# Patient Record
Sex: MALE | Race: WHITE | URBAN - METROPOLITAN AREA
[De-identification: names, ages, dates, MRNs, and addresses within clinical notes are randomized per-mention and may not be internally consistent; named-entity substitution may affect disease eponyms.]

---

## 2017-03-06 ENCOUNTER — OFFICE VISIT (OUTPATIENT)
Dept: FAMILY MEDICINE CLINIC | Age: 52
End: 2017-03-06

## 2017-03-06 VITALS
HEIGHT: 72 IN | HEART RATE: 59 BPM | BODY MASS INDEX: 16.66 KG/M2 | SYSTOLIC BLOOD PRESSURE: 133 MMHG | DIASTOLIC BLOOD PRESSURE: 96 MMHG | RESPIRATION RATE: 16 BRPM | OXYGEN SATURATION: 98 % | TEMPERATURE: 97.1 F | WEIGHT: 123 LBS

## 2017-03-06 DIAGNOSIS — F41.9 ANXIETY: ICD-10-CM

## 2017-03-06 DIAGNOSIS — M54.2 NECK PAIN: ICD-10-CM

## 2017-03-06 DIAGNOSIS — F10.20 ALCOHOLISM (HCC): Primary | ICD-10-CM

## 2017-03-06 DIAGNOSIS — Z88.9 H/O SEASONAL ALLERGIES: ICD-10-CM

## 2017-03-06 DIAGNOSIS — I10 ESSENTIAL HYPERTENSION: ICD-10-CM

## 2017-03-06 RX ORDER — LEVOTHYROXINE SODIUM 25 UG/1
25 TABLET ORAL
Qty: 30 TAB | Refills: 3 | Status: SHIPPED | OUTPATIENT
Start: 2017-03-06 | End: 2018-02-05 | Stop reason: SDUPTHER

## 2017-03-06 RX ORDER — GABAPENTIN 100 MG/1
CAPSULE ORAL 3 TIMES DAILY
COMMUNITY
End: 2017-03-06 | Stop reason: SDUPTHER

## 2017-03-06 RX ORDER — CYCLOBENZAPRINE HCL 5 MG
5 TABLET ORAL
COMMUNITY
End: 2017-03-06 | Stop reason: SDUPTHER

## 2017-03-06 RX ORDER — HYDROXYZINE PAMOATE 50 MG/1
25 CAPSULE ORAL
COMMUNITY
End: 2017-03-06 | Stop reason: SDUPTHER

## 2017-03-06 RX ORDER — HYDROXYZINE PAMOATE 50 MG/1
50 CAPSULE ORAL
Qty: 120 CAP | Refills: 3 | Status: SHIPPED | OUTPATIENT
Start: 2017-03-06 | End: 2022-06-27

## 2017-03-06 RX ORDER — LEVOTHYROXINE SODIUM 25 UG/1
TABLET ORAL
COMMUNITY
End: 2017-03-06 | Stop reason: SDUPTHER

## 2017-03-06 RX ORDER — LISINOPRIL 2.5 MG/1
2.5 TABLET ORAL DAILY
Qty: 30 TAB | Refills: 3 | Status: SHIPPED | OUTPATIENT
Start: 2017-03-06 | End: 2018-02-05 | Stop reason: DRUGHIGH

## 2017-03-06 RX ORDER — CETIRIZINE HCL 10 MG
10 TABLET ORAL
Qty: 30 TAB | Refills: 3 | Status: SHIPPED | OUTPATIENT
Start: 2017-03-06 | End: 2018-03-28 | Stop reason: SDUPTHER

## 2017-03-06 RX ORDER — CYCLOBENZAPRINE HCL 5 MG
5 TABLET ORAL
Qty: 60 TAB | Refills: 3 | Status: SHIPPED | OUTPATIENT
Start: 2017-03-06 | End: 2017-03-06 | Stop reason: DRUGHIGH

## 2017-03-06 RX ORDER — GABAPENTIN 100 MG/1
100 CAPSULE ORAL 3 TIMES DAILY
Qty: 90 CAP | Refills: 3 | Status: SHIPPED | OUTPATIENT
Start: 2017-03-06 | End: 2017-04-20 | Stop reason: DRUGHIGH

## 2017-03-06 RX ORDER — LORATADINE 10 MG/1
10 TABLET ORAL
COMMUNITY
End: 2017-03-06 | Stop reason: ALTCHOICE

## 2017-03-06 RX ORDER — TRAMADOL HYDROCHLORIDE 50 MG/1
50 TABLET ORAL 2 TIMES DAILY
Qty: 60 TAB | Refills: 1 | Status: SHIPPED | OUTPATIENT
Start: 2017-03-06 | End: 2018-02-05 | Stop reason: SDUPTHER

## 2017-03-06 RX ORDER — DULOXETIN HYDROCHLORIDE 30 MG/1
30 CAPSULE, DELAYED RELEASE ORAL DAILY
Qty: 30 CAP | Refills: 3 | Status: SHIPPED | OUTPATIENT
Start: 2017-03-06 | End: 2017-03-22 | Stop reason: SDUPTHER

## 2017-03-06 RX ORDER — CYCLOBENZAPRINE HCL 10 MG
10 TABLET ORAL
Qty: 60 TAB | Refills: 2 | Status: SHIPPED | OUTPATIENT
Start: 2017-03-06 | End: 2022-06-27 | Stop reason: SDUPTHER

## 2017-03-06 RX ORDER — DULOXETIN HYDROCHLORIDE 30 MG/1
30 CAPSULE, DELAYED RELEASE ORAL DAILY
COMMUNITY
End: 2017-03-06 | Stop reason: SDUPTHER

## 2017-03-06 RX ORDER — CLONIDINE HYDROCHLORIDE 0.1 MG/1
TABLET ORAL 2 TIMES DAILY
COMMUNITY
End: 2017-04-20 | Stop reason: ALTCHOICE

## 2017-03-06 RX ORDER — MELOXICAM 15 MG/1
15 TABLET ORAL DAILY
COMMUNITY
End: 2022-06-27

## 2017-03-06 NOTE — PROGRESS NOTES
Discharge instructions reviewed with patient  PAP application Cymbalta 30 mg po daily, Beconase 42 mcg one spray each nostril daily  RTC in 1 month recheck BP  Medication list and understanding of medications reviewed with patient. OTC and herbal medications reviewed and added to med list if applicable  Barriers to adherence assessed. Guidance given regarding new medications this visit, including reason for taking this medicine, and common side effects.

## 2017-03-06 NOTE — PROGRESS NOTES
HPI  Arely Whelan is a 54 y.o. male being seen today for   Chief Complaint   Patient presents with    Medication Refill   . IOV for this pt with chronic neck pain, alcoholism in remission, htn, hypothyroid. previoulsy at [facility] but cant afford the copay at this time. he states that he needs refills on meds. Was on lisinopril for bp but when he was having anxiety and  etoh withdrawal he was changed to clonidine. Sober at this time and hopeful and confident for sustained recovery. Tramadol is the only thing that helps his neck pain. Uses 2-3 daily from his prior pcp. Per pt has seen ortho and has \"locked\" vertebrae    Past Medical History:   Diagnosis Date    Alcoholism ([facility] [address]     Chronic pain     Hypertension     Thyroid disease          ROS  Patient states that he is feeling well. Denies complaints of chest pain, shortness of breath, swelling of legs, dizziness or weakness. he denies nausea, vomiting or diarrhea. Current Outpatient Prescriptions   Medication Sig    cloNIDine HCl (CATAPRES) 0.1 mg tablet Take  by mouth two (2) times a day.  meloxicam (MOBIC) 15 mg tablet Take 15 mg by mouth daily.  TRIAMCINOLONE, BULK, by Does Not Apply route.  lisinopril (PRINIVIL, ZESTRIL) 2.5 mg tablet Take 1 Tab by mouth daily.  cetirizine (ZYRTEC) 10 mg tablet Take 1 Tab by mouth daily as needed for Allergies.  DULoxetine (CYMBALTA) 30 mg capsule Take 1 Cap by mouth daily.  hydrOXYzine pamoate (VISTARIL) 50 mg capsule Take 1 Cap by mouth four (4) times daily as needed for Itching.  levothyroxine (SYNTHROID) 25 mcg tablet Take 1 Tab by mouth Daily (before breakfast).  gabapentin (NEURONTIN) 100 mg capsule Take 1 Cap by mouth three (3) times daily.  traMADol (ULTRAM) 50 mg tablet Take 1 Tab by mouth two (2) times a day. Max Daily Amount: 100 mg.  cyclobenzaprine (FLEXERIL) 10 mg tablet Take 1 Tab by mouth three (3) times daily as needed for Muscle Spasm(s). No current facility-administered medications for this visit. PE  Visit Vitals    BP (!) 133/96 (BP 1 Location: Left arm, BP Patient Position: Sitting)    Pulse (!) 59    Temp 97.1 °F (36.2 °C) (Oral)    Resp 16    Ht 6' (1.829 m)    Wt 123 lb (55.8 kg)    SpO2 98%    BMI 16.68 kg/m2        Alert and oriented with normal mood and affect. he is well developed and well nourished . Lungs are clear without wheezing. Heart rate is regular without murmurs or gallops. There is no lower extremity edema. No results found for this or any previous visit. Assessment and Plan:        ICD-10-CM ICD-9-CM    1. Alcoholism ([facility] [address] F10.20 303.90    2. Neck pain M54.2 723.1    3. Essential hypertension I10 401.9    4. Anxiety F41.9 300.00    5.  H/O seasonal allergies Z88.9 V15.09      Refilled meds as current except changed clonidine back to lisinopril  Pap for cymbalta 30mg  beconase 42 mcg one spray each nostril daily    rtc 1 mos recheck bp  Advised we cannot rx tramadol long term      La Nena Emery MD

## 2017-03-06 NOTE — PATIENT INSTRUCTIONS
Alcohol and Drug Problems: Care Instructions  Your Care Instructions  You can improve your life and health by stopping your use of alcohol or drugs. Ending dependency on alcohol or drugs may help you feel and sleep better. You may get along better with your family, friends, and coworkers. There are medicines and programs that can help. Follow-up care is a key part of your treatment and safety. Be sure to make and go to all appointments, and call your doctor if you are having problems. It's also a good idea to know your test results and keep a list of the medicines you take. How can you care for yourself at home? · If you have been given medicine to help keep you sober or reduce your cravings, be sure to take it as prescribed. · Talk to your doctor about programs that can help you stop using drugs or drinking alcohol. · If your doctor prescribes disulfiram (Antabuse), do not drink any alcohol while you are taking this medicine. You may have severe, even life-threatening, side effects from even small amounts of alcohol. · Do not tempt yourself by keeping alcohol or drugs in your home. · Learn how to say no when other people drink or use drugs. Or don't spend time with people who drink or use drugs. · Use the time and money spent on drinking or drugs to do something fun with your family or friends. Preventing a relapse  · Do not drink alcohol or use drugs at all. Using any amount of alcohol or drugs greatly increases your risk for relapse. · Seek help from organizations such as Alcoholics Anonymous, Narcotics Anonymous, or treatment facilities if you feel the need to drink alcohol or use drugs again. · Remember that recovery is a lifelong process. · Stay away from situations, friends, or places that may lead you to drink or use drugs. · Have a plan to spot and deal with relapse. Learn to recognize changes in your thinking that lead you to drink or use drugs. These are warning signs.  Get help before you start to drink or use drugs again. · Get help as soon as you can if you relapse. Some people make a plan with another person that outlines what they want that person to do for them if they relapse. The plan usually includes how to handle the relapse and who to notify in case of relapse. · Don't give up. Remember that a relapse does not mean that you have failed. Use the experience to learn the triggers that lead you to drink or use drugs. Then quit again. Many people have several relapses before they are able to quit for good. When should you call for help? Call 911 anytime you think you may need emergency care. For example, call if:  · You feel you cannot stop from hurting yourself or someone else. Call your doctor now or seek immediate medical care if:  · You have serious withdrawal symptoms such as confusion, hallucinations, or severe trembling. Watch closely for changes in your health, and be sure to contact your doctor if:  · You have a relapse. · You need more help or support to stop. Where can you learn more? Go to http://viral-jeffrey.info/. Enter [id number] in the search box to learn more about \"Alcohol and Drug Problems: Care Instructions. \"  Current as of: February 24, 2016  Content Version: 11.1  © 7784-0586 [employer]. Care instructions adapted under license by [employer] (which disclaims liability or warranty for this information). If you have questions about a medical condition or this instruction, always ask your healthcare professional. [place] [address] any warranty or liability for your use of this information. Anxiety Disorder: Care Instructions  Your Care Instructions  Anxiety is a normal reaction to stress. Difficult situations can cause you to have symptoms such as sweaty palms and a nervous feeling. In an anxiety disorder, the symptoms are far more severe.  Constant worry, muscle tension, trouble sleeping, nausea and diarrhea, and other symptoms can make normal daily activities difficult or impossible. These symptoms may occur for no reason, and they can affect your work, school, or social life. Medicines, counseling, and self-care can all help. Follow-up care is a key part of your treatment and safety. Be sure to make and go to all appointments, and call your doctor if you are having problems. It's also a good idea to know your test results and keep a list of the medicines you take. How can you care for yourself at home? · Take medicines exactly as directed. Call your doctor if you think you are having a problem with your medicine. · Go to your counseling sessions and follow-up appointments. · Recognize and accept your anxiety. Then, when you are in a situation that makes you anxious, say to yourself, \"This is not an emergency. I feel uncomfortable, but I am not in danger. I can keep going even if I feel anxious. \"  · Be kind to your body:  ¨ Relieve tension with exercise or a massage. ¨ Get enough rest.  ¨ Avoid alcohol, caffeine, nicotine, and illegal drugs. They can increase your anxiety level and cause sleep problems. ¨ Learn and do relaxation techniques. See below for more about these techniques. · Engage your mind. Get out and do something you enjoy. Go to a funny movie, or take a walk or hike. Plan your day. Having too much or too little to do can make you anxious. · Keep a record of your symptoms. Discuss your fears with a good friend or family member, or join a support group for people with similar problems. Talking to others sometimes relieves stress. · Get involved in social groups, or volunteer to help others. Being alone sometimes makes things seem worse than they are. · Get at least 30 minutes of exercise on most days of the week to relieve stress. Walking is a good choice. You also may want to do other activities, such as running, swimming, cycling, or playing tennis or team sports.   Relaxation techniques  Do relaxation exercises 10 to 20 minutes a day. You can play soothing, relaxing music while you do them, if you wish. · Tell others in your house that you are going to do your relaxation exercises. Ask them not to disturb you. · Find a comfortable place, away from all distractions and noise. · Lie down on your back, or sit with your back straight. · Focus on your breathing. Make it slow and steady. · Breathe in through your nose. Breathe out through either your nose or mouth. · Breathe deeply, filling up the area between your navel and your rib cage. Breathe so that your belly goes up and down. · Do not hold your breath. · Breathe like this for 5 to 10 minutes. Notice the feeling of calmness throughout your whole body. As you continue to breathe slowly and deeply, relax by doing the following for another 5 to 10 minutes:  · Tighten and relax each muscle group in your body. You can begin at your toes and work your way up to your head. · Imagine your muscle groups relaxing and becoming heavy. · Empty your mind of all thoughts. · Let yourself relax more and more deeply. · Become aware of the state of calmness that surrounds you. · When your relaxation time is over, you can bring yourself back to alertness by moving your fingers and toes and then your hands and feet and then stretching and moving your entire body. Sometimes people fall asleep during relaxation, but they usually wake up shortly afterward. · Always give yourself time to return to full alertness before you drive a car or do anything that might cause an accident if you are not fully alert. Never play a relaxation tape while you drive a car. When should you call for help? Call 911 anytime you think you may need emergency care. For example, call if:  · You feel you cannot stop from hurting yourself or someone else.   Keep the numbers for these national suicide hotlines: 7-244-508-TALK (8-920.574.1789) and 9-529-ELWELAT (7-157.633.6387). If you or someone you know talks about suicide or feeling hopeless, get help right away. Watch closely for changes in your health, and be sure to contact your doctor if:  · You have anxiety or fear that affects your life. · You have symptoms of anxiety that are new or different from those you had before. Where can you learn more? Go to http://viral-jeffrey.info/. Enter P754 in the search box to learn more about \"Anxiety Disorder: Care Instructions. \"  Current as of: July 26, 2016  Content Version: 11.1  © 2307-5950 [employer]. Care instructions adapted under license by [employer] (which disclaims liability or warranty for this information). If you have questions about a medical condition or this instruction, always ask your healthcare professional. [address] any warranty or liability for your use of this information. Neck Pain: Care Instructions  Your Care Instructions  You can have neck pain anywhere from the bottom of your head to the top of your shoulders. It can spread to the upper back or arms. Injuries, painting a ceiling, sleeping with your neck twisted, staying in one position for too long, and many other activities can cause neck pain. Most neck pain gets better with home care. Your doctor may recommend medicine to relieve pain or relax your muscles. He or she may suggest exercise and physical therapy to increase flexibility and relieve stress. You may need to wear a special (cervical) collar to support your neck for a day or two. Follow-up care is a key part of your treatment and safety. Be sure to make and go to all appointments, and call your doctor if you are having problems. It's also a good idea to know your test results and keep a list of the medicines you take. How can you care for yourself at home? · Try using a heating pad on a low or medium setting for 15 to 20 minutes every 2 or 3 hours.  Try a warm shower in place of one session with the heating pad. · You can also try an ice pack for 10 to 15 minutes every 2 to 3 hours. Put a thin cloth between the ice and your skin. · Take pain medicines exactly as directed. ¨ If the doctor gave you a prescription medicine for pain, take it as prescribed. ¨ If you are not taking a prescription pain medicine, ask your doctor if you can take an over-the-counter medicine. · If your doctor recommends a cervical collar, wear it exactly as directed. When should you call for help? Call your doctor now or seek immediate medical care if:  · You have new or worsening numbness in your arms, buttocks or legs. · You have new or worsening weakness in your arms or legs. (This could make it hard to stand up.)  · You lose control of your bladder or bowels. Watch closely for changes in your health, and be sure to contact your doctor if:  · Your neck pain is getting worse. · You are not getting better after 1 week. · You do not get better as expected. Where can you learn more? Go to http://viral-jeffrey.info/. Enter [id number] in the search box to learn more about \"Neck Pain: Care Instructions. \"  Current as of: May 23, 2016  Content Version: 11.1  © 9635-9323 [employer]. Care instructions adapted under license by [employer] (which disclaims liability or warranty for this information). If you have questions about a medical condition or this instruction, always ask your healthcare professional. [place] [address] any warranty or liability for your use of this information.

## 2017-03-06 NOTE — PROGRESS NOTES
Chief Complaint   Patient presents with    Medication Refill     1. When and where did you last receive medical care? Yes Where: [address]    2. When and where did you last have preventive care such as mammogram, pap smears or colon screening? no  3. What is your current living situation (for example, live alone, live in home with immediate family members)? [employer]    4. Do you have any problems with communication such trouble seeing, hearing, or understanding instructions? No    5. Do you have an advance directive? This is a document that you can give to family members with instructions for how you would want them to make health care decisions for you if you were unable to speak for yourself. (For example, unconscious, delerious)No    PMH/FH/Social Hx reviewed and updated as needed     Applicable screenings reviewed and updated as needed  Medication reconciliation performed. Patient does need medication refills. Health Maintenance reviewed.

## 2017-03-06 NOTE — MR AVS SNAPSHOT
Visit Information Date & Time Provider Department Dept. Phone Encounter #  
 3/6/2017  9:45 AM [place] [facility] [id number] Upcoming Health Maintenance Date Due DTaP/Tdap/Td series (1 - Tdap) 11/24/1988 INFLUENZA AGE 9 TO ADULT 8/1/2016 Allergies as of 3/6/2017  Review Complete On: 3/6/2017 By: Evelio Alarcon No Known Allergies Current Immunizations  Never Reviewed No immunizations on file. Not reviewed this visit You Were Diagnosed With   
  
 Codes Comments Alcoholism ([facility] [address]    -  Primary ICD-10-CM: F10.20 ICD-9-CM: 303.90 Neck pain     ICD-10-CM: M54.2 ICD-9-CM: 723.1 Essential hypertension     ICD-10-CM: I10 
ICD-9-CM: 401.9 Anxiety     ICD-10-CM: F41.9 ICD-9-CM: 300.00 H/O seasonal allergies     ICD-10-CM: Z88.9 ICD-9-CM: V15.09 Vitals BP Pulse Temp Resp Height(growth percentile) Weight(growth percentile) (!) 133/96 (BP 1 Location: Left arm, BP Patient Position: Sitting) (!) 59 97.1 °F (36.2 °C) (Oral) 16 6' (1.829 m) 123 lb (55.8 kg) SpO2 BMI Smoking Status 98% 16.68 kg/m2 Current Every Day Smoker Vitals History BMI and BSA Data Body Mass Index Body Surface Area  
 16.68 kg/m 2 1.68 m 2 Your Updated Medication List  
  
   
This list is accurate as of: 3/6/17 11:09 AM.  Always use your most recent med list.  
  
  
  
  
 cetirizine 10 mg tablet Commonly known as:  ZYRTEC Take 1 Tab by mouth daily as needed for Allergies. cloNIDine HCl 0.1 mg tablet Commonly known as:  CATAPRES Take  by mouth two (2) times a day. cyclobenzaprine 5 mg tablet Commonly known as:  FLEXERIL Take 1 Tab by mouth three (3) times daily as needed for Muscle Spasm(s). DULoxetine 30 mg capsule Commonly known as:  CYMBALTA Take 1 Cap by mouth daily. gabapentin 100 mg capsule Commonly known as:  NEURONTIN  
 Take 1 Cap by mouth three (3) times daily. hydrOXYzine pamoate 50 mg capsule Commonly known as:  VISTARIL Take 1 Cap by mouth four (4) times daily as needed for Itching. levothyroxine 25 mcg tablet Commonly known as:  SYNTHROID Take 1 Tab by mouth Daily (before breakfast). lisinopril 2.5 mg tablet Commonly known as:  Pinky Pinch Take 1 Tab by mouth daily. MOBIC 15 mg tablet Generic drug:  meloxicam  
Take 15 mg by mouth daily. traMADol 50 mg tablet Commonly known as:  ULTRAM  
Take 1 Tab by mouth two (2) times a day. Max Daily Amount: 100 mg. TRIAMCINOLONE (BULK)  
by Does Not Apply route. Prescriptions Printed Refills  
 lisinopril (PRINIVIL, ZESTRIL) 2.5 mg tablet 3 Sig: Take 1 Tab by mouth daily. Class: Print Route: Oral  
 cetirizine (ZYRTEC) 10 mg tablet 3 Sig: Take 1 Tab by mouth daily as needed for Allergies. Class: Print Route: Oral  
 DULoxetine (CYMBALTA) 30 mg capsule 3 Sig: Take 1 Cap by mouth daily. Class: Print Route: Oral  
 cyclobenzaprine (FLEXERIL) 5 mg tablet 3 Sig: Take 1 Tab by mouth three (3) times daily as needed for Muscle Spasm(s). Class: Print Route: Oral  
 hydrOXYzine pamoate (VISTARIL) 50 mg capsule 3 Sig: Take 1 Cap by mouth four (4) times daily as needed for Itching. Class: Print Route: Oral  
 levothyroxine (SYNTHROID) 25 mcg tablet 3 Sig: Take 1 Tab by mouth Daily (before breakfast). Class: Print Route: Oral  
 gabapentin (NEURONTIN) 100 mg capsule 3 Sig: Take 1 Cap by mouth three (3) times daily. Class: Print Route: Oral  
 traMADol (ULTRAM) 50 mg tablet 1 Sig: Take 1 Tab by mouth two (2) times a day. Max Daily Amount: 100 mg. Class: Print Route: Oral  
  
Patient Instructions Alcohol and Drug Problems: Care Instructions Your Care Instructions You can improve your life and health by stopping your use of alcohol or drugs. Ending dependency on alcohol or drugs may help you feel and sleep better. You may get along better with your family, friends, and coworkers. There are medicines and programs that can help. Follow-up care is a key part of your treatment and safety. Be sure to make and go to all appointments, and call your doctor if you are having problems. It's also a good idea to know your test results and keep a list of the medicines you take. How can you care for yourself at home? · If you have been given medicine to help keep you sober or reduce your cravings, be sure to take it as prescribed. · Talk to your doctor about programs that can help you stop using drugs or drinking alcohol. · If your doctor prescribes disulfiram (Antabuse), do not drink any alcohol while you are taking this medicine. You may have severe, even life-threatening, side effects from even small amounts of alcohol. · Do not tempt yourself by keeping alcohol or drugs in your home. · Learn how to say no when other people drink or use drugs. Or don't spend time with people who drink or use drugs. · Use the time and money spent on drinking or drugs to do something fun with your family or friends. Preventing a relapse · Do not drink alcohol or use drugs at all. Using any amount of alcohol or drugs greatly increases your risk for relapse. · Seek help from organizations such as Alcoholics Anonymous, Narcotics Anonymous, or treatment facilities if you feel the need to drink alcohol or use drugs again. · Remember that recovery is a lifelong process. · Stay away from situations, friends, or places that may lead you to drink or use drugs. · Have a plan to spot and deal with relapse. Learn to recognize changes in your thinking that lead you to drink or use drugs. These are warning signs. Get help before you start to drink or use drugs again. · Get help as soon as you can if you relapse.  Some people make a plan with another person that outlines what they want that person to do for them if they relapse. The plan usually includes how to handle the relapse and who to notify in case of relapse. · Don't give up. Remember that a relapse does not mean that you have failed. Use the experience to learn the triggers that lead you to drink or use drugs. Then quit again. Many people have several relapses before they are able to quit for good. When should you call for help? Call 911 anytime you think you may need emergency care. For example, call if: 
· You feel you cannot stop from hurting yourself or someone else. Call your doctor now or seek immediate medical care if: 
· You have serious withdrawal symptoms such as confusion, hallucinations, or severe trembling. Watch closely for changes in your health, and be sure to contact your doctor if: 
· You have a relapse. · You need more help or support to stop. Where can you learn more? Go to http://viralZigswitchjeffrey.info/. Enter [id number] in the search box to learn more about \"Alcohol and Drug Problems: Care Instructions. \" Current as of: February 24, 2016 Content Version: 11.1 © 2722-7718 [employer]. Care instructions adapted under license by [employer] (which disclaims liability or warranty for this information). If you have questions about a medical condition or this instruction, always ask your healthcare professional. [address] any warranty or liability for your use of this information. Anxiety Disorder: Care Instructions Your Care Instructions Anxiety is a normal reaction to stress. Difficult situations can cause you to have symptoms such as sweaty palms and a nervous feeling. In an anxiety disorder, the symptoms are far more severe. Constant worry, muscle tension, trouble sleeping, nausea and diarrhea, and other symptoms can make normal daily activities difficult or impossible.  These symptoms may occur for no reason, and they can affect your work, school, or social life. Medicines, counseling, and self-care can all help. Follow-up care is a key part of your treatment and safety. Be sure to make and go to all appointments, and call your doctor if you are having problems. It's also a good idea to know your test results and keep a list of the medicines you take. How can you care for yourself at home? · Take medicines exactly as directed. Call your doctor if you think you are having a problem with your medicine. · Go to your counseling sessions and follow-up appointments. · Recognize and accept your anxiety. Then, when you are in a situation that makes you anxious, say to yourself, \"This is not an emergency. I feel uncomfortable, but I am not in danger. I can keep going even if I feel anxious. \" · Be kind to your body: ¨ Relieve tension with exercise or a massage. ¨ Get enough rest. 
¨ Avoid alcohol, caffeine, nicotine, and illegal drugs. They can increase your anxiety level and cause sleep problems. ¨ Learn and do relaxation techniques. See below for more about these techniques. · Engage your mind. Get out and do something you enjoy. Go to a funny movie, or take a walk or hike. Plan your day. Having too much or too little to do can make you anxious. · Keep a record of your symptoms. Discuss your fears with a good friend or family member, or join a support group for people with similar problems. Talking to others sometimes relieves stress. · Get involved in social groups, or volunteer to help others. Being alone sometimes makes things seem worse than they are. · Get at least 30 minutes of exercise on most days of the week to relieve stress. Walking is a good choice. You also may want to do other activities, such as running, swimming, cycling, or playing tennis or team sports. Relaxation techniques Do relaxation exercises 10 to 20 minutes a day.  You can play soothing, relaxing music while you do them, if you wish. · Tell others in your house that you are going to do your relaxation exercises. Ask them not to disturb you. · Find a comfortable place, away from all distractions and noise. · Lie down on your back, or sit with your back straight. · Focus on your breathing. Make it slow and steady. · Breathe in through your nose. Breathe out through either your nose or mouth. · Breathe deeply, filling up the area between your navel and your rib cage. Breathe so that your belly goes up and down. · Do not hold your breath. · Breathe like this for 5 to 10 minutes. Notice the feeling of calmness throughout your whole body. As you continue to breathe slowly and deeply, relax by doing the following for another 5 to 10 minutes: · Tighten and relax each muscle group in your body. You can begin at your toes and work your way up to your head. · Imagine your muscle groups relaxing and becoming heavy. · Empty your mind of all thoughts. · Let yourself relax more and more deeply. · Become aware of the state of calmness that surrounds you. · When your relaxation time is over, you can bring yourself back to alertness by moving your fingers and toes and then your hands and feet and then stretching and moving your entire body. Sometimes people fall asleep during relaxation, but they usually wake up shortly afterward. · Always give yourself time to return to full alertness before you drive a car or do anything that might cause an accident if you are not fully alert. Never play a relaxation tape while you drive a car. When should you call for help? Call 911 anytime you think you may need emergency care. For example, call if: 
· You feel you cannot stop from hurting yourself or someone else. Keep the numbers for these national suicide hotlines: 5-192-326-TALK (0-469.661.1945) and 8-295-MXMYPYS (1-382.388.6029).  If you or someone you know talks about suicide or feeling hopeless, get help right away. Watch closely for changes in your health, and be sure to contact your doctor if: 
· You have anxiety or fear that affects your life. · You have symptoms of anxiety that are new or different from those you had before. Where can you learn more? Go to http://viral-jeffrey.info/. Enter P754 in the search box to learn more about \"Anxiety Disorder: Care Instructions. \" Current as of: July 26, 2016 Content Version: 11.1 © 5542-4205 [employer]. Care instructions adapted under license by [employer] (which disclaims liability or warranty for this information). If you have questions about a medical condition or this instruction, always ask your healthcare professional. [address] any warranty or liability for your use of this information. Neck Pain: Care Instructions Your Care Instructions You can have neck pain anywhere from the bottom of your head to the top of your shoulders. It can spread to the upper back or arms. Injuries, painting a ceiling, sleeping with your neck twisted, staying in one position for too long, and many other activities can cause neck pain. Most neck pain gets better with home care. Your doctor may recommend medicine to relieve pain or relax your muscles. He or she may suggest exercise and physical therapy to increase flexibility and relieve stress. You may need to wear a special (cervical) collar to support your neck for a day or two. Follow-up care is a key part of your treatment and safety. Be sure to make and go to all appointments, and call your doctor if you are having problems. It's also a good idea to know your test results and keep a list of the medicines you take. How can you care for yourself at home?  
· Try using a heating pad on a low or medium setting for 15 to 20 minutes every 2 or 3 hours. Try a warm shower in place of one session with the heating pad. · You can also try an ice pack for 10 to 15 minutes every 2 to 3 hours. Put a thin cloth between the ice and your skin. · Take pain medicines exactly as directed. ¨ If the doctor gave you a prescription medicine for pain, take it as prescribed. ¨ If you are not taking a prescription pain medicine, ask your doctor if you can take an over-the-counter medicine. · If your doctor recommends a cervical collar, wear it exactly as directed. When should you call for help? Call your doctor now or seek immediate medical care if: 
· You have new or worsening numbness in your arms, buttocks or legs. · You have new or worsening weakness in your arms or legs. (This could make it hard to stand up.) · You lose control of your bladder or bowels. Watch closely for changes in your health, and be sure to contact your doctor if: 
· Your neck pain is getting worse. · You are not getting better after 1 week. · You do not get better as expected. Where can you learn more? Go to http://viral-jeffrey.info/. Enter [id number] in the search box to learn more about \"Neck Pain: Care Instructions. \" Current as of: May 23, 2016 Content Version: 11.1 © 7866-5380 Healthwise[employer] Incorporated. Care instructions adapted under license by [employer] (which disclaims liability or warranty for this information). If you have questions about a medical condition or this instruction, always ask your healthcare professional. [place] [address] any warranty or liability for your use of this information. Introducing [facility]! Turner Garcia introduces [employer] patient portal. Now you can access parts of your medical record, email your doctor's office, and request medication refills online. 1. In your internet browser, go to [email or web address] 2. Click on the First Time User? Click Here link in the Sign In box. You will see the New Member Sign Up page. 3. Enter your [employer] Access Code exactly as it appears below. You will not need to use this code after youve completed the sign-up process. If you do not sign up before the expiration date, you must request a new code. · [employer] Access Code: [email or web address] Expires: 6/4/2017 11:09 AM 
 
4. Enter the last four digits of your Social Security Number (xxxx) and Date of Birth (mm/dd/yyyy) as indicated and click Submit. You will be taken to the next sign-up page. 5. Create a [employer] ID. This will be your [employer] login ID and cannot be changed, so think of one that is secure and easy to remember. 6. Create a [employer] password. You can change your password at any time. 7. Enter your Password Reset Question and Answer. This can be used at a later time if you forget your password. 8. Enter your e-mail address. You will receive e-mail notification when new information is available in [address] 9. Click Sign Up. You can now view and download portions of your medical record. 10. Click the Download Summary menu link to download a portable copy of your medical information. If you have questions, please visit the Frequently Asked Questions section of the [employer] website. Remember, [employer] is NOT to be used for urgent needs. For medical emergencies, dial 911. Now available from your iPhone and Android! Please provide this summary of care documentation to your next provider. If you have any questions after today's visit, please call [phone].

## 2017-03-16 ENCOUNTER — OFFICE VISIT (OUTPATIENT)
Dept: FAMILY MEDICINE CLINIC | Age: 52
End: 2017-03-16

## 2017-03-16 VITALS
HEIGHT: 72 IN | RESPIRATION RATE: 12 BRPM | TEMPERATURE: 98.1 F | BODY MASS INDEX: 16.8 KG/M2 | WEIGHT: 124 LBS | DIASTOLIC BLOOD PRESSURE: 93 MMHG | SYSTOLIC BLOOD PRESSURE: 149 MMHG | OXYGEN SATURATION: 100 % | HEART RATE: 93 BPM

## 2017-03-16 DIAGNOSIS — J40 BRONCHITIS: Primary | ICD-10-CM

## 2017-03-16 DIAGNOSIS — Z88.9 H/O SEASONAL ALLERGIES: ICD-10-CM

## 2017-03-16 DIAGNOSIS — L40.9 PSORIASIS: ICD-10-CM

## 2017-03-16 RX ORDER — CLOBETASOL PROPIONATE 0.5 MG/G
OINTMENT TOPICAL 2 TIMES DAILY
Qty: 30 G | Refills: 1
Start: 2017-03-16 | End: 2022-06-27 | Stop reason: SDUPTHER

## 2017-03-16 RX ORDER — PENICILLIN V POTASSIUM 500 MG/1
500 TABLET, FILM COATED ORAL 3 TIMES DAILY
Qty: 21 TAB | Refills: 0
Start: 2017-03-16 | End: 2017-03-23

## 2017-03-16 NOTE — PROGRESS NOTES
MARQUEZ Mcmanus is a 54 y.o. male being seen today for   Chief Complaint   Patient presents with    Sinus Pain     Congestion, headache, productive cough, green mucus x 8 days   . he states that for 8 days uri sx. Started with sore throat, then congestion and post nasal drip. Lost voice and then it came back. Some body aches. All of this is gettting better but still has sore throat and sputum is yellow. Some headaches. No fever    pcn has worked well for bronchitis in the past    Has not started lisinopril. Self increased to 200mg gabapentin at hs and that is helping pain. Also wanted cream for psoriasis. Used clobetasol in the past with good results    Past Medical History:   Diagnosis Date    Alcoholism ([address]     Chronic pain     Hypertension     Thyroid disease          ROS  Patient states that he is feeling well. Denies complaints of chest pain, shortness of breath, swelling of legs, dizziness or weakness. he denies nausea, vomiting or diarrhea. Current Outpatient Prescriptions   Medication Sig    penicillin v potassium (VEETID) 500 mg tablet Take 1 Tab by mouth three (3) times daily for 7 days.  clobetasol (TEMOVATE) 0.05 % ointment Apply  to affected area two (2) times a day.  cloNIDine HCl (CATAPRES) 0.1 mg tablet Take  by mouth two (2) times a day.  cetirizine (ZYRTEC) 10 mg tablet Take 1 Tab by mouth daily as needed for Allergies.  DULoxetine (CYMBALTA) 30 mg capsule Take 1 Cap by mouth daily.  hydrOXYzine pamoate (VISTARIL) 50 mg capsule Take 1 Cap by mouth four (4) times daily as needed for Itching.  levothyroxine (SYNTHROID) 25 mcg tablet Take 1 Tab by mouth Daily (before breakfast).  gabapentin (NEURONTIN) 100 mg capsule Take 1 Cap by mouth three (3) times daily.  traMADol (ULTRAM) 50 mg tablet Take 1 Tab by mouth two (2) times a day. Max Daily Amount: 100 mg.     cyclobenzaprine (FLEXERIL) 10 mg tablet Take 1 Tab by mouth three (3) times daily as needed for Muscle Spasm(s).  meloxicam (MOBIC) 15 mg tablet Take 15 mg by mouth daily.  TRIAMCINOLONE, BULK, by Does Not Apply route.  lisinopril (PRINIVIL, ZESTRIL) 2.5 mg tablet Take 1 Tab by mouth daily. No current facility-administered medications for this visit. PE  Visit Vitals    BP (!) 149/93 (BP 1 Location: Left arm, BP Patient Position: Sitting)    Pulse 93    Temp 98.1 °F (36.7 °C) (Oral)    Resp 12    Ht 6' (1.829 m)    Wt 124 lb (56.2 kg)    SpO2 100%    BMI 16.82 kg/m2        Alert and oriented with normal mood and affect. he is well developed and well nourished . Lungs are clear without wheezing. Heart rate is regular without murmurs or gallops. There is no lower extremity edema. TMs OP/PP clear. No cervical LAD    No results found for this or any previous visit. Assessment and Plan:        ICD-10-CM ICD-9-CM    1. Bronchitis  Likely viral  Will observe for now  No better 3-5 days can fill and take rx penicillin  [id number]    2. H/O seasonal allergies  rx flonase while await beconase Z88.9 V15.09    3.  Psoriasis  rx clobetasol L40.9 King Marshall MD

## 2017-03-16 NOTE — PROGRESS NOTES
No chief complaint on file. 1. Have you been to the ER, urgent care clinic since your last visit? Hospitalized since your last visit? No    2. Have you seen or consulted any other health care providers outside of the [address] [place] [address] since your last visit? No    3. When was your last Mammogram, Pap smear, and/or Colon screening? Mammogram: Year: n/a Pap smear: year: n/a Colonoscopy: Year:n/a  PMH/FH/Social Hx reviewed and updated as needed      Applicable screenings reviewed and updated as needed  Medication reconciliation performed. Patient does not need medication refills. Health Maintenance reviewed.

## 2017-03-16 NOTE — PROGRESS NOTES
Discharge instructions reviewed with patient    Medication list and understanding of medications reviewed with patient. OTC and herbal medications reviewed and added to med list if applicable  Barriers to adherence assessed. Guidance given regarding new medications this visit, including reason for taking this medicine, and common side effects. Made f/u appointment.

## 2017-03-16 NOTE — MR AVS SNAPSHOT
Visit Information Date & Time Provider Department Dept. Phone Encounter #  
 3/16/2017  9:15 AM Julio Padron, [address] [place] [address] [id number] Your Appointments 4/20/2017  1:30 PM  
Follow Up with Julio Padron MD  
[facility] [place] [facility]) Appt Note: f/u APPOINTMENT  
 [facility] 83 205 [facility] 83 [phone] Upcoming Health Maintenance Date Due Pneumococcal 19-64 Medium Risk (1 of 1 - PPSV23) 11/24/1986 DTaP/Tdap/Td series (1 - Tdap) 11/24/1988 INFLUENZA AGE 9 TO ADULT 8/1/2016 Allergies as of 3/16/2017  Review Complete On: 3/16/2017 By: Jay Mejia No Known Allergies Current Immunizations  Never Reviewed No immunizations on file. Not reviewed this visit You Were Diagnosed With   
  
 Codes Comments Bronchitis    -  Primary ICD-10-CM: Q65 ICD-9-CM: 975 H/O seasonal allergies     ICD-10-CM: Z88.9 ICD-9-CM: V15.09 Psoriasis     ICD-10-CM: L40.9 ICD-9-CM: 696.1 Vitals BP Pulse Temp Resp Height(growth percentile) Weight(growth percentile) (!) 149/93 (BP 1 Location: Left arm, BP Patient Position: Sitting) 93 98.1 °F (36.7 °C) (Oral) 12 6' (1.829 m) 124 lb (56.2 kg) SpO2 BMI Smoking Status 100% 16.82 kg/m2 Current Every Day Smoker BMI and BSA Data Body Mass Index Body Surface Area  
 16.82 kg/m 2 1.69 m 2 Preferred Pharmacy Pharmacy Name Phone [address], [address] [facility] [phone] Your Updated Medication List  
  
   
This list is accurate as of: 3/16/17 10:46 AM.  Always use your most recent med list.  
  
  
  
  
 cetirizine 10 mg tablet Commonly known as:  ZYRTEC Take 1 Tab by mouth daily as needed for Allergies. clobetasol 0.05 % ointment Commonly known as:  [place] Apply  to affected area two (2) times a day. cloNIDine HCl 0.1 mg tablet Commonly known as:  CATAPRES Take  by mouth two (2) times a day. cyclobenzaprine 10 mg tablet Commonly known as:  FLEXERIL Take 1 Tab by mouth three (3) times daily as needed for Muscle Spasm(s). DULoxetine 30 mg capsule Commonly known as:  CYMBALTA Take 1 Cap by mouth daily. gabapentin 100 mg capsule Commonly known as:  NEURONTIN Take 1 Cap by mouth three (3) times daily. hydrOXYzine pamoate 50 mg capsule Commonly known as:  VISTARIL Take 1 Cap by mouth four (4) times daily as needed for Itching. levothyroxine 25 mcg tablet Commonly known as:  SYNTHROID Take 1 Tab by mouth Daily (before breakfast). lisinopril 2.5 mg tablet Commonly known as:  Dhruv Shutters Take 1 Tab by mouth daily. MOBIC 15 mg tablet Generic drug:  meloxicam  
Take 15 mg by mouth daily. penicillin v potassium 500 mg tablet Commonly known as:  VEETID Take 1 Tab by mouth three (3) times daily for 7 days. traMADol 50 mg tablet Commonly known as:  ULTRAM  
Take 1 Tab by mouth two (2) times a day. Max Daily Amount: 100 mg. TRIAMCINOLONE (BULK)  
by Does Not Apply route. Introducing [address]! Danuta Roque introduces [employer] patient portal. Now you can access parts of your medical record, email your doctor's office, and request medication refills online. 1. In your internet browser, go to [email or web address] 2. Click on the First Time User? Click Here link in the Sign In box. You will see the New Member Sign Up page. 3. Enter your [employer] Access Code exactly as it appears below. You will not need to use this code after youve completed the sign-up process. If you do not sign up before the expiration date, you must request a new code. · [employer] Access Code: [email or web address] Expires: 6/4/2017 12:09 PM 
 
4.  Enter the last four digits of your Social Security Number (xxxx) and Date of Birth (mm/dd/yyyy) as indicated and click Submit. You will be taken to the next sign-up page. 5. Create a [employer] ID. This will be your [employer] login ID and cannot be changed, so think of one that is secure and easy to remember. 6. Create a [employer] password. You can change your password at any time. 7. Enter your Password Reset Question and Answer. This can be used at a later time if you forget your password. 8. Enter your e-mail address. You will receive e-mail notification when new information is available in [address] 9. Click Sign Up. You can now view and download portions of your medical record. 10. Click the Download Summary menu link to download a portable copy of your medical information. If you have questions, please visit the Frequently Asked Questions section of the [employer] website. Remember, [employer] is NOT to be used for urgent needs. For medical emergencies, dial 911. Now available from your iPhone and Android! Please provide this summary of care documentation to your next provider. If you have any questions after today's visit, please call [phone].

## 2017-03-21 DIAGNOSIS — Z91.09 ENVIRONMENTAL ALLERGIES: Primary | ICD-10-CM

## 2017-03-21 NOTE — TELEPHONE ENCOUNTER
[address] [place] [address] sent patient Jim Kirby Beconase -aq nasal spray through the PAP. Letter sent to patient for  and order routed to provider.

## 2017-03-21 NOTE — LETTER
3/21/2017 1:16 PM 
 
Mr. Malia [address] [facility] [phone] Thank you for participating in the [facility] Patient Assistance Program. 
 
This is notification that your item(s) was delivered to us. Please  your item(s) between 11:00 am and 1:00 pm, at one of our [place] sites within 14 days. When you arrive, you will need to register inside as a \"nurse visit\" to  your medication. Notify the registrar that you are there to  medication and they will register you as soon as possible. There may be a short wait but we try to make the process as fast as possible. If you fail to follow-up for regular appointments, the [facility] may discontinue providing your medication. To see when the [address] will be in your neighborhood, go to 
[email or web address] 
or call [phone], select your language (1 for english or 2 for Portuguese), and then option 2. Sincerely, Emaline Boast, MD

## 2017-03-22 NOTE — LETTER
3/22/2017 12:02 PM 
 
Mr. Reese [address] [facility] [phone] Thank you for participating in the [address] [place] [address] Patient Assistance Program. 
 
This is notification that your item(s) was delivered to us. Please  your item(s) between 11:00 am and 1:00 pm, at one of our [place] sites within 14 days. When you arrive, you will need to register inside as a \"nurse visit\" to  your medication. Notify the registrar that you are there to  medication and they will register you as soon as possible. There may be a short wait but we try to make the process as fast as possible. If you fail to follow-up for regular appointments, the [address] [place] [address] may discontinue providing your medication. To see when the [address] will be in your neighborhood, go to 
[email or web address] 
or call [phone], select your language (1 for english or 2 for Nigerien), and then option 2. Sincerely, Richmond Deluca MD

## 2017-03-22 NOTE — LETTER
Name  
Tin Ward  YOB: 1965 Effective Dates 3/22/2017  to *** GREEN means Go! Use CONTROL medicine daily YELLOW means Caution! Add RESCUE medicine RED means DANGER! Get help from a doctor now! Health Care Provider Lynne Simon MD  Providers Phone [phone] 
y  
y  
y Parent/Guardian  
*** Parent/Guardian Phone   Parent/Guardian Email:   
Additional Emergency Contact  Contact Phone   Contact Email:   
Asthma Severity ? Intermittent or Persistent: ? Mild ? Moderate ? Severe  Asthma Triggers (Things that make your asthma worse) ? Colds ? Smoke (tobacco, incense) ? Pollen ? Dust ? Animals:_________ ? Strong odors ? Mold/moisture ? Pests (rodents, cockroaches) ? Stress/Emotions ? Exercise ? Gastroesophageal reflux ? Season ([place]): Fall, Winter, Spring, Summer ? Other:_____________________________________ Last Flu Shot: / / *** Pneumonia Shot: / / [place]us [place] Zone: Go!   Take these CONTROL (PREVENTION) Medicines EVERY Day You have ALL of these:  Breathing is easy  No cough or wheeze  Can work and play  Can sleep all night Peak flow in this area: ______ to ______ (More than 80% of Personal Best) Personal best peak flow:________  . No control medicines required. .___***________________________________, ____ puff (s) MDI with Spacer __ times a day Inhaled Corticosteroid or Inhaled corticosteroid/long-acting â-agonist .____________________***_________________, ____ nebulizer treatment (s) _***_ times a day Inhaled Corticosteroid . ______****_____________________, take _***___ by mouth once daily at bedtime Leukotriene antagonist For asthma with exercise, ADD: .____****____, ___***__ puffs with spacer 15 minutes before exercise Fast acting Inhaled â-agonist For nasal/environmental allergy, ADD: .____________________________, use _____ spray (s) per nostril _____ times a day Nasal corticosteroid Always rinse mouth after using your daily inhaled medicine. You have ANY of these: Yellow Zone: Caution!   Continue CONTROL Medicines and ADD RESCUE Medicines  Cough or mild wheeze  ._________________, ____ puffs with spacer every ____ hours as needed Inhaled b-agonist   
 First sign of cold  . _________________, ____ nebulizer treatment (s) every ____ hours as needed  Tight chest  Inhaled b-agonist   
 Problems sleeping,  . Other ________________________________________________________   
working, or playing Peak flow in this area: Call your Healthcare Provider if you need rescue medicine for more than 24   
______ to _____ (60%-80% of Personal Best)  hours or two times a week, or if your rescue medicine doesnt work You have ANY of these:  Cant talk, eat, or walk well  Medicine is not helping  Breathing hard and fast  Blue lips and fingernails  Tired or lethargic  Ribs show Peak flow in this area: _____ to _____  . ________________, __ puffs with spacer every 15 minutes, for THREE treatments Inhaled â-agonist . ________________, __ nebulizer treatment every 15 minutes, for THREE treatments Inhaled â-agonist Call your doctor while administering the treatments. .Other IF YOU CANNOT CONTACT YOUR DOCTOR: Call 911 for an ambulance, or go directly to the Emergency Department! SCHOOL MEDICATION CONSENT & HEALTH CARE PROVIDER ORDER CHECK ALL THAT APPLY: ____ Student instructed in proper use of their asthma medications, and in my opinion, CAN CARRY AND SELF-ADMINISTER INHALER AT SCHOOL. ____ Student is to notify designated school health officials after using inhaler at school. ____ Student needs supervision or assistance to use inhaler. ____ Student should NOT carry inhaler while at school. MD/NP/PA SIGNATURE: ____________________________ DATE_______ REQUIRED SIGNATURES: I give permission for school personnel to follow this plan, administer medication and care for my child and contact my provider if necessary.  I assume full responsibility for providing the school with prescribed medication and delivery/ monitoring devices. I approve this Asthma Management Plan for my child. PARENT/GUARDIAN ________________________ Date _______ SCHOOL NURSE/DESIGNEE ___________________ Date _______ OTHER _________________________________ Date _______ [place] Asthma Action Plan approved by the [facility]) 4/11 Based on NAEPP Guidelines and modified with permission from the [facility]. Asthma Action Plan via [address] [employer], and [employer] of [address] [employer]

## 2017-03-22 NOTE — TELEPHONE ENCOUNTER
Korin sent 120 tabs of Cymbalta 30 mg  Through the PAP. Letter sent to patient for  and order routed to provider.

## 2017-03-23 NOTE — TELEPHONE ENCOUNTER
Med list and chart notes reviewed. Pharmacy Assistance Medication approved for pickup.     beconase 42 mcg   One spray each nostril daily

## 2017-03-28 RX ORDER — DULOXETIN HYDROCHLORIDE 30 MG/1
30 CAPSULE, DELAYED RELEASE ORAL DAILY
Qty: 120 CAP | Refills: 0 | Status: SHIPPED | COMMUNITY
Start: 2017-03-28 | End: 2018-03-09 | Stop reason: SDUPTHER

## 2017-03-28 NOTE — TELEPHONE ENCOUNTER
Med list and chart notes reviewed. Pharmacy Assistance Medication approved for pickup.     cymbalta 30 mg po daily    First pap pickup, please ensure pt knows how to call for refill

## 2017-04-20 ENCOUNTER — OFFICE VISIT (OUTPATIENT)
Dept: FAMILY MEDICINE CLINIC | Age: 52
End: 2017-04-20

## 2017-04-20 ENCOUNTER — HOSPITAL ENCOUNTER (OUTPATIENT)
Dept: LAB | Age: 52
Discharge: HOME | End: 2017-04-20

## 2017-04-20 VITALS
SYSTOLIC BLOOD PRESSURE: 148 MMHG | DIASTOLIC BLOOD PRESSURE: 95 MMHG | WEIGHT: 124 LBS | BODY MASS INDEX: 16.8 KG/M2 | HEIGHT: 72 IN | HEART RATE: 92 BPM | TEMPERATURE: 98.9 F | OXYGEN SATURATION: 99 % | RESPIRATION RATE: 15 BRPM

## 2017-04-20 DIAGNOSIS — E03.8 HYPOTHYROIDISM DUE TO HASHIMOTO'S THYROIDITIS: ICD-10-CM

## 2017-04-20 DIAGNOSIS — R53.83 FATIGUE, UNSPECIFIED TYPE: ICD-10-CM

## 2017-04-20 DIAGNOSIS — I10 ESSENTIAL HYPERTENSION: Primary | ICD-10-CM

## 2017-04-20 DIAGNOSIS — E06.3 HYPOTHYROIDISM DUE TO HASHIMOTO'S THYROIDITIS: ICD-10-CM

## 2017-04-20 DIAGNOSIS — Z88.9 H/O SEASONAL ALLERGIES: ICD-10-CM

## 2017-04-20 LAB
ALBUMIN SERPL BCP-MCNC: 3.7 G/DL (ref 3.4–5)
ALBUMIN/GLOB SERPL: 1.1 {RATIO} (ref 0.8–1.7)
ALP SERPL-CCNC: 135 U/L (ref 45–117)
ALT SERPL-CCNC: 29 U/L (ref 16–61)
ANION GAP BLD CALC-SCNC: 8 MMOL/L (ref 3–18)
AST SERPL W P-5'-P-CCNC: 20 U/L (ref 15–37)
BASOPHILS # BLD AUTO: 0.1 K/UL (ref 0–0.06)
BASOPHILS # BLD: 1 % (ref 0–2)
BILIRUB SERPL-MCNC: 0.2 MG/DL (ref 0.2–1)
BUN SERPL-MCNC: 10 MG/DL (ref 7–18)
BUN/CREAT SERPL: 10 (ref 12–20)
CALCIUM SERPL-MCNC: 9.1 MG/DL (ref 8.5–10.1)
CHLORIDE SERPL-SCNC: 104 MMOL/L (ref 100–108)
CHOLEST SERPL-MCNC: 181 MG/DL
CO2 SERPL-SCNC: 29 MMOL/L (ref 21–32)
CREAT SERPL-MCNC: 1.01 MG/DL (ref 0.6–1.3)
DIFFERENTIAL METHOD BLD: ABNORMAL
EOSINOPHIL # BLD: 0.4 K/UL (ref 0–0.4)
EOSINOPHIL NFR BLD: 4 % (ref 0–5)
ERYTHROCYTE [DISTWIDTH] IN BLOOD BY AUTOMATED COUNT: 13 % (ref 11.6–14.5)
EST. AVERAGE GLUCOSE BLD GHB EST-MCNC: 117 MG/DL
GLOBULIN SER CALC-MCNC: 3.3 G/DL (ref 2–4)
GLUCOSE SERPL-MCNC: 81 MG/DL (ref 74–99)
HBA1C MFR BLD: 5.7 % (ref 4.2–5.6)
HCT VFR BLD AUTO: 37.2 % (ref 36–48)
HDLC SERPL-MCNC: 35 MG/DL (ref 40–60)
HDLC SERPL: 5.2 {RATIO} (ref 0–5)
HGB BLD-MCNC: 12.2 G/DL (ref 13–16)
LDLC SERPL CALC-MCNC: 116.8 MG/DL (ref 0–100)
LIPID PROFILE,FLP: ABNORMAL
LYMPHOCYTES # BLD AUTO: 19 % (ref 21–52)
LYMPHOCYTES # BLD: 2 K/UL (ref 0.9–3.6)
MCH RBC QN AUTO: 31.5 PG (ref 24–34)
MCHC RBC AUTO-ENTMCNC: 32.8 G/DL (ref 31–37)
MCV RBC AUTO: 96.1 FL (ref 74–97)
MONOCYTES # BLD: 1 K/UL (ref 0.05–1.2)
MONOCYTES NFR BLD AUTO: 10 % (ref 3–10)
NEUTS SEG # BLD: 7.2 K/UL (ref 1.8–8)
NEUTS SEG NFR BLD AUTO: 66 % (ref 40–73)
PLATELET # BLD AUTO: 397 K/UL (ref 135–420)
PMV BLD AUTO: 10.2 FL (ref 9.2–11.8)
POTASSIUM SERPL-SCNC: 3.9 MMOL/L (ref 3.5–5.5)
PROT SERPL-MCNC: 7 G/DL (ref 6.4–8.2)
RBC # BLD AUTO: 3.87 M/UL (ref 4.7–5.5)
SODIUM SERPL-SCNC: 141 MMOL/L (ref 136–145)
TRIGL SERPL-MCNC: 146 MG/DL (ref ?–150)
TSH SERPL DL<=0.05 MIU/L-ACNC: 2.24 UIU/ML (ref 0.36–3.74)
VLDLC SERPL CALC-MCNC: 29.2 MG/DL
WBC # BLD AUTO: 10.7 K/UL (ref 4.6–13.2)

## 2017-04-20 PROCEDURE — 84443 ASSAY THYROID STIM HORMONE: CPT | Performed by: FAMILY MEDICINE

## 2017-04-20 PROCEDURE — 80053 COMPREHEN METABOLIC PANEL: CPT | Performed by: FAMILY MEDICINE

## 2017-04-20 PROCEDURE — 84403 ASSAY OF TOTAL TESTOSTERONE: CPT | Performed by: FAMILY MEDICINE

## 2017-04-20 PROCEDURE — 80061 LIPID PANEL: CPT | Performed by: FAMILY MEDICINE

## 2017-04-20 PROCEDURE — 85025 COMPLETE CBC W/AUTO DIFF WBC: CPT | Performed by: FAMILY MEDICINE

## 2017-04-20 PROCEDURE — 83036 HEMOGLOBIN GLYCOSYLATED A1C: CPT | Performed by: FAMILY MEDICINE

## 2017-04-20 RX ORDER — GABAPENTIN 300 MG/1
300 CAPSULE ORAL 2 TIMES DAILY
Qty: 60 CAP | Refills: 2
Start: 2017-04-20 | End: 2018-02-06 | Stop reason: DRUGHIGH

## 2017-04-20 NOTE — PROGRESS NOTES
No chief complaint on file. 1. Have you been to the ER, urgent care clinic since your last visit? Hospitalized since your last visit? No    2. Have you seen or consulted any other health care providers outside of the [facility] since your last visit? No    3. When was your last Pap smear? No  When was your last Mammogram? No  When was your last Colon screening? No    PMH/FH/Social Hx reviewed and updated as needed      Applicable screenings reviewed and updated as needed  Medication reconciliation performed. Patient does not need medication refills. Health Maintenance reviewed.

## 2017-04-20 NOTE — PROGRESS NOTES
HPI  Chris Garcia is a 54 y.o. male being seen today for   Chief Complaint   Patient presents with   [facility]    Sinus Infection   . he states that he is here for med pickup and labs. pcn worked for bronchitis and better for a week or so then had new sinus pressue/pain like his previous sinus infection. Has not been using flonase due to dry irritated mucus membranes. Would like to increase neurontin. Past Medical History:   Diagnosis Date    Alcoholism ([address]     Chronic pain     Hypertension     Thyroid disease          ROS  Patient states that he is feeling well. Denies complaints of chest pain, shortness of breath, swelling of legs, dizziness or weakness. he denies nausea, vomiting or diarrhea. Current Outpatient Prescriptions   Medication Sig    gabapentin (NEURONTIN) 300 mg capsule Take 1 Cap by mouth two (2) times a day.  DULoxetine (CYMBALTA) 30 mg capsule Take 1 Cap by mouth daily.  beclomethasone (BECONASE AQ) 42 mcg (0.042 %) nasal spray beconase-aq (180 spray) 25 gms    Use 1 spray in each nostril daily    clobetasol (TEMOVATE) 0.05 % ointment Apply  to affected area two (2) times a day.  meloxicam (MOBIC) 15 mg tablet Take 15 mg by mouth daily.  TRIAMCINOLONE, BULK, by Does Not Apply route.  lisinopril (PRINIVIL, ZESTRIL) 2.5 mg tablet Take 1 Tab by mouth daily.  cetirizine (ZYRTEC) 10 mg tablet Take 1 Tab by mouth daily as needed for Allergies.  hydrOXYzine pamoate (VISTARIL) 50 mg capsule Take 1 Cap by mouth four (4) times daily as needed for Itching.  levothyroxine (SYNTHROID) 25 mcg tablet Take 1 Tab by mouth Daily (before breakfast).  traMADol (ULTRAM) 50 mg tablet Take 1 Tab by mouth two (2) times a day. Max Daily Amount: 100 mg.  cyclobenzaprine (FLEXERIL) 10 mg tablet Take 1 Tab by mouth three (3) times daily as needed for Muscle Spasm(s). No current facility-administered medications for this visit.         PE  Visit Vitals    BP (!) 148/95 (BP 1 Location: Left arm, BP Patient Position: Sitting)    Pulse 92    Temp 98.9 °F (37.2 °C) (Oral)    Resp 15    Ht 6' (1.829 m)    Wt 124 lb (56.2 kg)    SpO2 99%    BMI 16.82 kg/m2        Alert and oriented with normal mood and affect. he is well developed and well nourished . Lungs are clear without wheezing. Heart rate is regular without murmurs or gallops. There is no lower extremity edema. No results found for this or any previous visit. Assessment and Plan:        ICD-10-CM ICD-9-CM    1. Essential hypertension I10 401.9    2. Hypothyroidism due to Hashimoto's thyroiditis Z99.1 533.4 METABOLIC PANEL, COMPREHENSIVE    E06.3 245.2 LIPID PANEL      HEMOGLOBIN A1C WITH EAG      CBC WITH AUTOMATED DIFF      TSH 3RD GENERATION      TESTOSTERONE, TOTAL, ADULT MALE   3. H/O seasonal allergies Z88.9 V15.09    4. Fatigue, unspecified type R53.83 780.79 TESTOSTERONE, TOTAL, ADULT MALE       Increase to 5mg lisinopril  Follow sinus issues, likely viral    rx pcn no better one more week.      Increase gabapentin to 300mg bid    rtc 1 or 2 mos recheck bp      Jey Montoya MD

## 2017-04-20 NOTE — PROGRESS NOTES
Discharge instructions reviewed with patient    Medication list and understanding of medications reviewed with patient. OTC and herbal medications reviewed and added to med list if applicable  Barriers to adherence assessed. Guidance given regarding new medications this visit, including reason for taking this medicine, and common side effects. Given AVS after labs drawn. Per provider patient picked up Pharmacy Assistance Program medication. Patient verified understanding of medication and directions. Medication: Cymbalta 120 caps  ManufacturerCarlos Melony    Lot  [id number]       Exp 3/30/19    Medication: beconase 1 nasal spray    : Chris Skipper  Lot  [facility]         Exp [id number]                        Discharge instructions reviewed with patient. Medication list and understanding of medications reviewed with patient. OTC and herbal medications reviewed and added to med list if applicable  Barriers to adherence assessed. Guidance given regarding new medications this visit, including reason for taking this medicine, and common side effects.

## 2017-04-20 NOTE — MR AVS SNAPSHOT
Visit Information Date & Time Provider Department Dept. Phone Encounter #  
 4/20/2017  1:30 PM Belné Fairbanks, [address] [id number] Upcoming Health Maintenance Date Due Pneumococcal 19-64 Medium Risk (1 of 1 - PPSV23) 11/24/1986 DTaP/Tdap/Td series (1 - Tdap) 11/24/1988 INFLUENZA AGE 9 TO ADULT 8/1/2016 Allergies as of 4/20/2017  Review Complete On: 4/20/2017 By: Ruth Espinosa RN No Known Allergies Current Immunizations  Never Reviewed No immunizations on file. Not reviewed this visit You Were Diagnosed With   
  
 Codes Comments Essential hypertension    -  Primary ICD-10-CM: I10 
ICD-9-CM: 401.9 Hypothyroidism due to Hashimoto's thyroiditis     ICD-10-CM: E03.8, E06.3 ICD-9-CM: 244.8, 245.2 H/O seasonal allergies     ICD-10-CM: Z88.9 ICD-9-CM: V15.09 Fatigue, unspecified type     ICD-10-CM: R53.83 ICD-9-CM: 780.79 Vitals BP Pulse Temp Resp Height(growth percentile) Weight(growth percentile) (!) 148/95 (BP 1 Location: Left arm, BP Patient Position: Sitting) 92 98.9 °F (37.2 °C) (Oral) 15 6' (1.829 m) 124 lb (56.2 kg) SpO2 BMI Smoking Status 99% 16.82 kg/m2 Current Every Day Smoker Vitals History BMI and BSA Data Body Mass Index Body Surface Area  
 16.82 kg/m 2 1.69 m 2 Preferred Pharmacy Pharmacy Name Phone [address], [facility] [phone] Your Updated Medication List  
  
   
This list is accurate as of: 4/20/17  3:09 PM.  Always use your most recent med list.  
  
  
  
  
 beclomethasone 42 mcg (0.042 %) nasal spray Commonly known as:  BECONASE AQ  
beconase-aq (180 spray) 25 gms  Use 1 spray in each nostril daily  
  
 cetirizine 10 mg tablet Commonly known as:  ZYRTEC Take 1 Tab by mouth daily as needed for Allergies. clobetasol 0.05 % ointment Commonly known as:  [place] Pill Apply  to affected area two (2) times a day. cyclobenzaprine 10 mg tablet Commonly known as:  FLEXERIL Take 1 Tab by mouth three (3) times daily as needed for Muscle Spasm(s). DULoxetine 30 mg capsule Commonly known as:  CYMBALTA Take 1 Cap by mouth daily. gabapentin 100 mg capsule Commonly known as:  NEURONTIN Take 1 Cap by mouth three (3) times daily. hydrOXYzine pamoate 50 mg capsule Commonly known as:  VISTARIL Take 1 Cap by mouth four (4) times daily as needed for Itching. levothyroxine 25 mcg tablet Commonly known as:  SYNTHROID Take 1 Tab by mouth Daily (before breakfast). lisinopril 2.5 mg tablet Commonly known as:  [place] Neve Take 1 Tab by mouth daily. MOBIC 15 mg tablet Generic drug:  meloxicam  
Take 15 mg by mouth daily. traMADol 50 mg tablet Commonly known as:  ULTRAM  
Take 1 Tab by mouth two (2) times a day. Max Daily Amount: 100 mg. TRIAMCINOLONE (BULK)  
by Does Not Apply route. Introducing [facility]! Selene Shafer introduces [employer] patient portal. Now you can access parts of your medical record, email your doctor's office, and request medication refills online. 1. In your internet browser, go to [email or web address] 2. Click on the First Time User? Click Here link in the Sign In box. You will see the New Member Sign Up page. 3. Enter your [employer] Access Code exactly as it appears below. You will not need to use this code after youve completed the sign-up process. If you do not sign up before the expiration date, you must request a new code. · [employer] Access Code: [email or web address] Expires: 6/4/2017 12:09 PM 
 
4. Enter the last four digits of your Social Security Number (xxxx) and Date of Birth (mm/dd/yyyy) as indicated and click Submit. You will be taken to the next sign-up page. 5. Create a [employer] ID.  This will be your [employer] login ID and cannot be changed, so think of one that is secure and easy to remember. 6. Create a [employer] password. You can change your password at any time. 7. Enter your Password Reset Question and Answer. This can be used at a later time if you forget your password. 8. Enter your e-mail address. You will receive e-mail notification when new information is available in [address] 9. Click Sign Up. You can now view and download portions of your medical record. 10. Click the Download Summary menu link to download a portable copy of your medical information. If you have questions, please visit the Frequently Asked Questions section of the [employer] website. Remember, [employer] is NOT to be used for urgent needs. For medical emergencies, dial 911. Now available from your iPhone and Android! Please provide this summary of care documentation to your next provider. If you have any questions after today's visit, please call [phone].

## 2017-04-21 LAB
COMMENT, TESC2: NORMAL
TESTOST SERPL-MCNC: 598 NG/DL (ref 348–1197)

## 2018-02-05 ENCOUNTER — OFFICE VISIT (OUTPATIENT)
Dept: FAMILY MEDICINE CLINIC | Age: 53
End: 2018-02-05

## 2018-02-05 VITALS
TEMPERATURE: 98.7 F | SYSTOLIC BLOOD PRESSURE: 131 MMHG | BODY MASS INDEX: 16.8 KG/M2 | HEART RATE: 92 BPM | WEIGHT: 124 LBS | DIASTOLIC BLOOD PRESSURE: 87 MMHG | RESPIRATION RATE: 16 BRPM | HEIGHT: 72 IN | OXYGEN SATURATION: 99 %

## 2018-02-05 DIAGNOSIS — I10 ESSENTIAL HYPERTENSION: Primary | ICD-10-CM

## 2018-02-05 DIAGNOSIS — M54.2 NECK PAIN: ICD-10-CM

## 2018-02-05 RX ORDER — LEVOTHYROXINE SODIUM 25 UG/1
25 TABLET ORAL
Qty: 30 TAB | Refills: 5
Start: 2018-02-05 | End: 2022-06-27 | Stop reason: SDUPTHER

## 2018-02-05 RX ORDER — TRAMADOL HYDROCHLORIDE 50 MG/1
50 TABLET ORAL 2 TIMES DAILY
Qty: 90 TAB | Refills: 2
Start: 2018-02-05 | End: 2022-06-27 | Stop reason: ALTCHOICE

## 2018-02-05 RX ORDER — LISINOPRIL 5 MG/1
5 TABLET ORAL DAILY
Qty: 30 TAB | Refills: 5
Start: 2018-02-05 | End: 2022-06-27 | Stop reason: SDUPTHER

## 2018-02-05 NOTE — PATIENT INSTRUCTIONS
REMEMBERIke Joe [phone] OR [phone]   TO ASK FOR REFILLS WHEN YOU HAVE ONE MONTH   OF MEDICINE LEFT. Think of it the same way as when you call your regular pharmacy to order your medicine refills.

## 2018-02-05 NOTE — PROGRESS NOTES
No chief complaint on file. 1. Have you been to the ER, urgent care clinic since your last visit? Hospitalized since your last visit? No    2. Have you seen or consulted any other health care providers outside of the [address] [place] [address] since your last visit? No    3. When was your last Pap smear? N/A  When was your last Mammogram? N/A  When was your last Colon screening? No history of colon screening- postpone till living indoors    PMH/FH/Social Hx reviewed and updated as needed      Applicable screenings reviewed and updated as needed  Medication reconciliation performed. Patient does need medication refills. Health Maintenance reviewed. Patient has NO PHARMACY- hard scripts to be submitted to .

## 2018-02-06 RX ORDER — GABAPENTIN 300 MG/1
300 CAPSULE ORAL 2 TIMES DAILY
Qty: 60 CAP | Refills: 5
Start: 2018-02-06

## 2018-02-06 RX ORDER — GABAPENTIN 600 MG/1
600 TABLET ORAL 2 TIMES DAILY
Qty: 60 TAB | Refills: 5
Start: 2018-02-06 | End: 2018-02-06 | Stop reason: CLARIF

## 2018-02-06 NOTE — PROGRESS NOTES
MARQUEZ James is a 50 y.o. male being seen today for   Chief Complaint   Patient presents with    Other     refills/ blood work ? thryorid   . he states that he needs refill on gabapentin, lisinopril, synthroid. Also wondering if he could get tramadol rx. Has been on in the past and having increased neck and back pain due to he lost his housing and is sleeping in his car. Past Medical History:   Diagnosis Date    Alcoholism ([address]     Chronic pain     Hypertension     Thyroid disease          ROS  Patient states that he is feeling well. Denies complaints of chest pain, shortness of breath, swelling of legs, dizziness or weakness. he denies nausea, vomiting or diarrhea. Current Outpatient Prescriptions   Medication Sig    traMADol (ULTRAM) 50 mg tablet Take 1 Tab by mouth two (2) times a day. Max Daily Amount: 100 mg.  levothyroxine (SYNTHROID) 25 mcg tablet Take 1 Tab by mouth Daily (before breakfast).  lisinopril (PRINIVIL, ZESTRIL) 5 mg tablet Take 1 Tab by mouth daily.  gabapentin (NEURONTIN) 300 mg capsule Take 1 Cap by mouth two (2) times a day.  DULoxetine (CYMBALTA) 30 mg capsule Take 1 Cap by mouth daily.  cetirizine (ZYRTEC) 10 mg tablet Take 1 Tab by mouth daily as needed for Allergies.  hydrOXYzine pamoate (VISTARIL) 50 mg capsule Take 1 Cap by mouth four (4) times daily as needed for Itching.  cyclobenzaprine (FLEXERIL) 10 mg tablet Take 1 Tab by mouth three (3) times daily as needed for Muscle Spasm(s).  beclomethasone (BECONASE AQ) 42 mcg (0.042 %) nasal spray beconase-aq (180 spray) 25 gms    Use 1 spray in each nostril daily    clobetasol (TEMOVATE) 0.05 % ointment Apply  to affected area two (2) times a day.  meloxicam (MOBIC) 15 mg tablet Take 15 mg by mouth daily.  TRIAMCINOLONE, BULK, by Does Not Apply route. No current facility-administered medications for this visit.         PE  Visit Vitals    /87 (BP 1 Location: Left arm, BP Patient Position: Sitting)    Pulse 92    Temp 98.7 °F (37.1 °C) (Oral)    Resp 16    Ht 6' (1.829 m)    Wt 124 lb (56.2 kg)    SpO2 99%    BMI 16.82 kg/m2        Alert and oriented with normal mood and affect. he is well developed and well nourished . Lungs are clear without wheezing. Heart rate is regular without murmurs or gallops. There is no lower extremity edema. Results for orders placed or performed during the hospital encounter of 31/21/60   METABOLIC PANEL, COMPREHENSIVE   Result Value Ref Range    Sodium 141 136 - 145 mmol/L    Potassium 3.9 3.5 - 5.5 mmol/L    Chloride 104 100 - 108 mmol/L    CO2 29 21 - 32 mmol/L    Anion gap 8 3.0 - 18 mmol/L    Glucose 81 74 - 99 mg/dL    BUN 10 7.0 - 18 MG/DL    Creatinine 1.01 0.6 - 1.3 MG/DL    BUN/Creatinine ratio 10 (L) 12 - 20      GFR est AA >60 >60 ml/min/1.73m2    GFR est non-AA >60 >60 ml/min/1.73m2    Calcium 9.1 8.5 - 10.1 MG/DL    Bilirubin, total 0.2 0.2 - 1.0 MG/DL    ALT (SGPT) 29 16 - 61 U/L    AST (SGOT) 20 15 - 37 U/L    Alk.  phosphatase 135 (H) 45 - 117 U/L    Protein, total 7.0 6.4 - 8.2 g/dL    Albumin 3.7 3.4 - 5.0 g/dL    Globulin 3.3 2.0 - 4.0 g/dL    A-G Ratio 1.1 0.8 - 1.7     LIPID PANEL   Result Value Ref Range    LIPID PROFILE          Cholesterol, total 181 <200 MG/DL    Triglyceride 146 <150 MG/DL    HDL Cholesterol 35 (L) 40 - 60 MG/DL    LDL, calculated 116.8 (H) 0 - 100 MG/DL    VLDL, calculated 29.2 MG/DL    CHOL/HDL Ratio 5.2 (H) 0 - 5.0     HEMOGLOBIN A1C WITH EAG   Result Value Ref Range    Hemoglobin A1c 5.7 (H) 4.2 - 5.6 %    Est. average glucose 117 mg/dL   CBC WITH AUTOMATED DIFF   Result Value Ref Range    WBC 10.7 4.6 - 13.2 K/uL    RBC 3.87 (L) 4.70 - 5.50 M/uL    HGB 12.2 (L) 13.0 - 16.0 g/dL    HCT 37.2 36.0 - 48.0 %    MCV 96.1 74.0 - 97.0 FL    MCH 31.5 24.0 - 34.0 PG    MCHC 32.8 31.0 - 37.0 g/dL    RDW 13.0 11.6 - 14.5 %    PLATELET 629 720 - 877 K/uL    MPV 10.2 9.2 - 11.8 FL    NEUTROPHILS 66 40 - 73 %    LYMPHOCYTES 19 (L) 21 - 52 %    MONOCYTES 10 3 - 10 %    EOSINOPHILS 4 0 - 5 %    BASOPHILS 1 0 - 2 %    ABS. NEUTROPHILS 7.2 1.8 - 8.0 K/UL    ABS. LYMPHOCYTES 2.0 0.9 - 3.6 K/UL    ABS. MONOCYTES 1.0 0.05 - 1.2 K/UL    ABS. EOSINOPHILS 0.4 0.0 - 0.4 K/UL    ABS. BASOPHILS 0.1 (H) 0.0 - 0.06 K/UL    DF AUTOMATED     TSH 3RD GENERATION   Result Value Ref Range    TSH 2.24 0.36 - 3.74 uIU/mL   TESTOSTERONE, TOTAL, ADULT MALE   Result Value Ref Range    Testosterone 598 348 - 1197 ng/dL    Comment Comment           Assessment and Plan:        ICD-10-CM ICD-9-CM    1. Essential hypertension I10 401.9    2.  Neck pain M54.2 723.1 traMADol (ULTRAM) 50 mg tablet           Lance Lai MD

## 2018-02-12 DIAGNOSIS — Z91.09 ENVIRONMENTAL ALLERGIES: ICD-10-CM

## 2018-02-12 NOTE — LETTER
2/12/2018 9:43 AM 
 
Mr. Eden Thornton [address] [place] [address],[address] [place] [address] Thank you for participating in the [facility] Patient Assistance Program. 
 
This is notification that your item(s) was delivered to us. Please  your item(s) between 11:00 am and 1:00 pm, at one of our [place]  sites within 14 days. When you arrive, you will need to register inside as a \"nurse visit\" to  your medication. Notify the registrar that you are there to  medication and they will register you as soon as possible. There may be a short wait but we try to make the process as fast as possible. If you fail to follow-up for regular appointments, the [facility] may discontinue providing your medication. To see when the [address] will be in your neighborhood, go to 
[email or web address] 
or call [phone], select your language (1 for english or 2 for English), and then option 2. Sincerely, Shannan Perera MD

## 2018-02-12 NOTE — TELEPHONE ENCOUNTER
[facility] sent patient Lawanda Fleischer 1 INH nasal Beconase through the PAP. Letter sent to patient for  and order routed to provider.

## 2018-02-13 NOTE — TELEPHONE ENCOUNTER
Med list and chart notes reviewed. Pharmacy Assistance Medication approved for pickup.     beconase 42mcg one spray each nostril bid

## 2018-03-09 NOTE — TELEPHONE ENCOUNTER
[facility] sent patient, Cameron Seay, 240 capsules of Cymbalta 30 mg capsule via Pharmacy Assistance Program. Letter sent to patient and order routed to provider.

## 2018-03-09 NOTE — LETTER
3/9/2018 10:21 AM 
 
Mr. Ashanti Durant [address] [place] [address],[address] [address] Thank you for participating in the [address] [place] [address] Patient Assistance Program. 
 
This is notification that your item(s) was delivered to us. Please  your item(s) between 11:00 am and 1:00 pm, at one of our [place] sites within 14 days. When you arrive, you will need to register inside as a \"nurse visit\" to  your medication. Notify the registrar that you are there to  medication and they will register you as soon as possible. There may be a short wait but we try to make the process as fast as possible. If you fail to follow-up for regular appointments, the [address] [place] [address] may discontinue providing your medication. To see when the [address] will be in your neighborhood, go to 
[email or web address] 
or call [phone], select your language (1 for english or 2 for Latvian), and then option 2. Sincerely, Abdi Boland MD

## 2018-03-12 RX ORDER — DULOXETIN HYDROCHLORIDE 30 MG/1
30 CAPSULE, DELAYED RELEASE ORAL DAILY
Qty: 240 CAP | Refills: 0 | Status: SHIPPED | COMMUNITY
Start: 2018-03-12 | End: 2022-06-27

## 2018-03-12 NOTE — TELEPHONE ENCOUNTER
Med list and chart notes reviewed. Pharmacy Assistance Medication approved for pickup.     cymbalta 30mg po dialy

## 2018-03-30 RX ORDER — CETIRIZINE HCL 10 MG
TABLET ORAL
Qty: 30 TAB | Refills: 1 | Status: SHIPPED | OUTPATIENT
Start: 2018-03-30

## 2018-04-25 DIAGNOSIS — Z91.09 ENVIRONMENTAL ALLERGIES: ICD-10-CM

## 2018-04-25 NOTE — LETTER
4/25/2018 10:48 AM 
 
Mr. Jaime Veloz [address] [place] [address],[address] [place] [address] Thank you for participating in the [address] [place] [address] Patient Assistance Program. 
 
This is notification that your item(s) was delivered to us. Please  your item(s) between 11:00 am and 1:00 pm, at one of our [place] sites within 14 days. When you arrive, you will need to register inside as a \"nurse visit\" to  your medication. Notify the registrar that you are there to  medication and they will register you as soon as possible. There may be a short wait but we try to make the process as fast as possible. If you fail to follow-up for regular appointments, the [address] [place] [address] may discontinue providing your medication. To see when the [address] will be in your neighborhood, go to 
[email or web address] 
or call [phone], select your language (1 for english or 2 for Maori), and then option 2. Sincerely, Ranjith Jimenez MD

## 2018-04-25 NOTE — TELEPHONE ENCOUNTER
[facility] sent patient, Darshan Burton, 2 bottles of Beconase AQ 42 mcg nasal spray 25 GM via Pharmacy Assistance Program. Letter sent to patient and order routed to provider.

## 2018-04-30 NOTE — TELEPHONE ENCOUNTER
Med list and chart notes reviewed. Pharmacy Assistance Medication approved for pickup.     beconase 42 mcg  One spray each nostril daily or bid

## 2018-06-07 ENCOUNTER — CLINICAL SUPPORT (OUTPATIENT)
Dept: FAMILY MEDICINE CLINIC | Age: 53
End: 2018-06-07

## 2018-06-07 DIAGNOSIS — Z88.9 H/O SEASONAL ALLERGIES: Primary | ICD-10-CM

## 2018-06-07 DIAGNOSIS — M54.2 NECK PAIN: ICD-10-CM

## 2018-06-07 NOTE — PROGRESS NOTES
Per provider patient picked up Pharmacy Assistance Program medication. Medication: beconase 2 INH  : Vocab    Lot           Exp 11/19      Medication: cymbalta 240 caps  : Braxton Gavin  [id number]       Exp 12/19        Patient verified understanding of medication and directions. Discharge instructions reviewed with patient. Medication list and understanding of medications reviewed with patient. OTC and herbal medications reviewed and added to med list if applicable  Barriers to adherence assessed. Guidance given regarding new medications this visit, including reason for taking this medicine, and common side effects.

## 2019-07-31 ENCOUNTER — PATIENT MESSAGE (OUTPATIENT)
Dept: FAMILY MEDICINE CLINIC | Age: 54
End: 2019-07-31

## 2019-07-31 RX ORDER — GABAPENTIN 300 MG/1
300 CAPSULE ORAL 2 TIMES DAILY
Qty: 60 CAP | Refills: 5 | OUTPATIENT
Start: 2019-07-31

## 2019-08-26 ENCOUNTER — TELEPHONE (OUTPATIENT)
Dept: FAMILY MEDICINE CLINIC | Age: 54
End: 2019-08-26

## 2019-08-26 NOTE — TELEPHONE ENCOUNTER
I tried to contact this patient for an appointment and was unable to reach him. His phone just rings with no way to leave a voice mail. I called him Mother and left a message for someone to call us back so we can make an appt for the patient.

## 2022-03-18 PROBLEM — E06.3 HYPOTHYROIDISM DUE TO HASHIMOTO'S THYROIDITIS: Status: ACTIVE | Noted: 2017-04-20

## 2022-03-18 PROBLEM — M54.2 NECK PAIN: Status: ACTIVE | Noted: 2017-03-06

## 2022-03-18 PROBLEM — E03.8 HYPOTHYROIDISM DUE TO HASHIMOTO'S THYROIDITIS: Status: ACTIVE | Noted: 2017-04-20

## 2022-03-19 PROBLEM — F10.20 ALCOHOLISM (HCC): Status: ACTIVE | Noted: 2017-03-06

## 2022-03-19 PROBLEM — L40.9 PSORIASIS: Status: ACTIVE | Noted: 2017-03-16

## 2022-03-20 PROBLEM — Z88.9 H/O SEASONAL ALLERGIES: Status: ACTIVE | Noted: 2017-03-06

## 2022-03-20 PROBLEM — I10 ESSENTIAL HYPERTENSION: Status: ACTIVE | Noted: 2017-03-06

## 2022-03-20 PROBLEM — F41.9 ANXIETY: Status: ACTIVE | Noted: 2017-03-06

## 2022-04-20 ENCOUNTER — NURSE TRIAGE (OUTPATIENT)
Dept: OTHER | Facility: CLINIC | Age: 57
End: 2022-04-20

## 2022-04-20 NOTE — TELEPHONE ENCOUNTER
Received call from Dixie at [facility] with [employer] Complaint. Current Symptoms: Pt reports pain and swelling to his right lower abdomen. The area of swelling is about the size of a marble  Pt is concerned that he has a hernia. Pt had a hernia close by as a child. Pain occurs when he stands and walks around. Pain resolves when he lays down. Onset: 7-10 days ago    Associated Symptoms: None    Pain Severity: Intermittent pain, currently rates pain level a 3 out of 10, \"burning ache\"    Temperature: Denies fever    What has been tried: Nothing yet    Pregnant: NA    Recommended disposition: Pt would like to be seen. Pt is not yet an established pt. Care advice provided, patient verbalizes understanding; denies any other questions or concerns; instructed to call back for any new or worsening symptoms. Patient/Caller agrees with recommended disposition; writer provided warm transfer to [facility] at [facility] for appointment scheduling    Attention Provider: Thank you for allowing me to participate in the care of your patient. The patient was connected to triage in response to information provided to the [facility]. Please do not respond through this encounter as the response is not directed to a shared pool.     Reason for Disposition   Patient wants to be seen    Protocols used: ABDOMINAL PAIN - MALE-ADULT-OH

## 2022-06-27 ENCOUNTER — OFFICE VISIT (OUTPATIENT)
Dept: INTERNAL MEDICINE CLINIC | Age: 57
End: 2022-06-27
Payer: MEDICAID

## 2022-06-27 VITALS
HEART RATE: 88 BPM | TEMPERATURE: 97.6 F | OXYGEN SATURATION: 100 % | HEIGHT: 72 IN | SYSTOLIC BLOOD PRESSURE: 123 MMHG | DIASTOLIC BLOOD PRESSURE: 87 MMHG | WEIGHT: 119 LBS | BODY MASS INDEX: 16.12 KG/M2

## 2022-06-27 DIAGNOSIS — Z12.11 COLON CANCER SCREENING: ICD-10-CM

## 2022-06-27 DIAGNOSIS — Z12.5 PROSTATE CANCER SCREENING: ICD-10-CM

## 2022-06-27 DIAGNOSIS — M54.2 CHRONIC NECK PAIN: ICD-10-CM

## 2022-06-27 DIAGNOSIS — G89.29 CHRONIC NECK PAIN: ICD-10-CM

## 2022-06-27 DIAGNOSIS — I10 ESSENTIAL HYPERTENSION: Primary | ICD-10-CM

## 2022-06-27 DIAGNOSIS — E78.5 HYPERLIPIDEMIA, UNSPECIFIED HYPERLIPIDEMIA TYPE: ICD-10-CM

## 2022-06-27 DIAGNOSIS — E06.3 HYPOTHYROIDISM DUE TO HASHIMOTO'S THYROIDITIS: ICD-10-CM

## 2022-06-27 DIAGNOSIS — Z11.59 NEED FOR HEPATITIS C SCREENING TEST: ICD-10-CM

## 2022-06-27 DIAGNOSIS — Z02.83 ENCOUNTER FOR DRUG SCREENING: ICD-10-CM

## 2022-06-27 DIAGNOSIS — M54.50 CHRONIC BILATERAL LOW BACK PAIN WITHOUT SCIATICA: ICD-10-CM

## 2022-06-27 DIAGNOSIS — E03.8 HYPOTHYROIDISM DUE TO HASHIMOTO'S THYROIDITIS: ICD-10-CM

## 2022-06-27 DIAGNOSIS — Z76.0 MEDICATION REFILL: ICD-10-CM

## 2022-06-27 DIAGNOSIS — G89.29 CHRONIC BILATERAL LOW BACK PAIN WITHOUT SCIATICA: ICD-10-CM

## 2022-06-27 PROCEDURE — 99204 OFFICE O/P NEW MOD 45 MIN: CPT | Performed by: NURSE PRACTITIONER

## 2022-06-27 RX ORDER — GABAPENTIN 300 MG/1
300 CAPSULE ORAL 2 TIMES DAILY
Qty: 60 CAPSULE | Refills: 5 | Status: CANCELLED | OUTPATIENT
Start: 2022-06-27

## 2022-06-27 RX ORDER — CLOBETASOL PROPIONATE 0.5 MG/G
OINTMENT TOPICAL 2 TIMES DAILY
Qty: 30 G | Refills: 1 | Status: SHIPPED | OUTPATIENT
Start: 2022-06-27

## 2022-06-27 RX ORDER — FLUTICASONE PROPIONATE 50 MCG
2 SPRAY, SUSPENSION (ML) NASAL DAILY
COMMUNITY
End: 2022-06-27 | Stop reason: SDUPTHER

## 2022-06-27 RX ORDER — LEVOTHYROXINE SODIUM 50 UG/1
50 TABLET ORAL
Qty: 90 TABLET | Refills: 1 | Status: SHIPPED | OUTPATIENT
Start: 2022-06-27

## 2022-06-27 RX ORDER — LISINOPRIL 10 MG/1
10 TABLET ORAL DAILY
Qty: 90 TABLET | Refills: 1 | Status: SHIPPED | OUTPATIENT
Start: 2022-06-27

## 2022-06-27 RX ORDER — CYCLOBENZAPRINE HCL 10 MG
10 TABLET ORAL
Qty: 60 TABLET | Refills: 2 | Status: SHIPPED | OUTPATIENT
Start: 2022-06-27

## 2022-06-27 RX ORDER — FLUTICASONE PROPIONATE 50 MCG
2 SPRAY, SUSPENSION (ML) NASAL DAILY
Qty: 1 EACH | Refills: 1 | Status: SHIPPED | OUTPATIENT
Start: 2022-06-27

## 2022-06-27 NOTE — PROGRESS NOTES
Genevieve Gonzalez is a 49 y.o. male (: 1965) presenting to address:    Chief Complaint   Patient presents with    New Patient       Vitals:    22 1531   Temp: 97.6 °F (36.4 °C)   Weight: 119 lb (54 kg)   Height: 6' (1.829 m)   PainSc:   0 - No pain       Hearing/Vision:   No exam data present    Learning Assessment:     Learning Assessment 2018   PRIMARY LEARNER Patient   PRIMARY LANGUAGE ENGLISH   LEARNER PREFERENCE PRIMARY READING   ANSWERED BY patient   RELATIONSHIP SELF     Depression Screening:     3 most recent PHQ Screens 2022   PHQ Not Done -   Little interest or pleasure in doing things Not at all   Feeling down, depressed, irritable, or hopeless Not at all   Total Score PHQ 2 0     Fall Risk Assessment:   No flowsheet data found. Abuse Screening:   No flowsheet data found. ADL Assessment:   No flowsheet data found. Coordination of Care Questionaire:   1. \"Have you been to the ER, urgent care clinic since your last visit? Hospitalized since your last visit? \" [address] [place] [address] early may    2. \"Have you seen or consulted any other health care providers outside of the [address] [place] [address] since your last visit? \" No     3. For patients aged 39-70: Has the patient had a colonoscopy / FIT/ Cologuard? No      If the patient is female:    4. For patients aged 41-77: Has the patient had a mammogram within the past 2 years? NA - based on age or sex  See top three    5. For patients aged 21-65: Has the patient had a pap smear? NA - based on age or sex    Advanced Directive:   1. Do you have an Advanced Directive? NO    2. Would you like information on Advanced Directives?  NO

## 2022-06-27 NOTE — PROGRESS NOTES
HISTORY OF PRESENT ILLNESS  Renetta Garsia is a 49 y.o. male. Here to establish care with PMHX HTN, hypothyroidism,  HPI  1) HTN - lisinopril 10 mg daily   BP Readings from Last 3 Encounters:   06/27/22 123/87   07/05/18 135/75   02/05/18 131/87     2) HLD - no meds  Lab Results   Component Value Date/Time    Cholesterol, total 181 04/20/2017 02:57 PM    HDL Cholesterol 35 (L) 04/20/2017 02:57 PM    LDL, calculated 116.8 (H) 04/20/2017 02:57 PM    VLDL, calculated 29.2 04/20/2017 02:57 PM    Triglyceride 146 04/20/2017 02:57 PM    CHOL/HDL Ratio 5.2 (H) 04/20/2017 02:57 PM     3) Hypothyroidism - Synthroid 50 mcg daily  Lab Results   Component Value Date/Time    TSH 2.24 04/20/2017 02:57 PM     4) Chronic pain syndrome - was taking mobic and tramadol - Neck pain and low back pain   Worsened with movement   Relieved with muscle relaxer and gabapentin helps nerve pain  Pain level 7/10 described as constant ache and shooting   Gabapentin 300 mg bid    reviewed     /87 (BP 1 Location: Right arm, BP Patient Position: Sitting, BP Cuff Size: Adult)   Pulse 88   Temp 97.6 °F (36.4 °C)   Ht 6' (1.829 m)   Wt 119 lb (54 kg)   SpO2 100%   BMI 16.14 kg/m²   Current Outpatient Medications   Medication Sig Dispense Refill    cyclobenzaprine (FLEXERIL) 10 mg tablet Take 1 Tablet by mouth two (2) times daily as needed for Muscle Spasm(s). 60 Tablet 2    levothyroxine (SYNTHROID) 50 mcg tablet Take 1 Tablet by mouth Daily (before breakfast). 90 Tablet 1    lisinopriL (PRINIVIL, ZESTRIL) 10 mg tablet Take 1 Tablet by mouth daily. 90 Tablet 1    clobetasoL (TEMOVATE) 0.05 % ointment Apply  to affected area two (2) times a day. 30 g 1    fluticasone propionate (FLONASE) 50 mcg/actuation nasal spray 2 Sprays by Both Nostrils route daily.  1 Each 1    cetirizine (ZYRTEC) 10 mg tablet TAKE ONE TABLET BY MOUTH ONCE DAILY AS NEEDED FOR ALLERGIES 30 Tab 1    gabapentin (NEURONTIN) 300 mg capsule Take 1 Cap by mouth two (2) times a day. 60 Cap 5       Review of Systems   Constitutional: Negative for chills, fever and malaise/fatigue. Eyes: Negative for blurred vision and double vision. Respiratory: Negative for cough, shortness of breath and wheezing. Cardiovascular: Negative for chest pain, palpitations and leg swelling. Musculoskeletal: Positive for back pain and neck pain. Neurological: Negative for dizziness and headaches. Psychiatric/Behavioral: The patient is nervous/anxious. Physical Exam  Vitals and nursing note reviewed. Constitutional:       General: He is not in acute distress. Appearance: Normal appearance. He is not ill-appearing. HENT:      Head: Normocephalic. Right Ear: Tympanic membrane, ear canal and external ear normal.      Left Ear: Tympanic membrane, ear canal and external ear normal.      Nose: Nose normal.      Mouth/Throat:      Mouth: Mucous membranes are moist.      Pharynx: Oropharynx is clear. Comments: MASK  Eyes:      General: No scleral icterus. Conjunctiva/sclera: Conjunctivae normal.   Neck:      Vascular: No carotid bruit. Cardiovascular:      Rate and Rhythm: Normal rate and regular rhythm. Pulses: Normal pulses. Heart sounds: Normal heart sounds. Pulmonary:      Effort: Pulmonary effort is normal. No respiratory distress. Breath sounds: Normal breath sounds. No wheezing. Abdominal:      General: Abdomen is flat. Musculoskeletal:         General: Normal range of motion. Cervical back: Normal range of motion. Right lower leg: No edema. Left lower leg: No edema. Lymphadenopathy:      Cervical: No cervical adenopathy. Skin:     General: Skin is warm and dry. Findings: No lesion or rash. Neurological:      General: No focal deficit present. Mental Status: He is alert and oriented to person, place, and time.    Psychiatric:         Mood and Affect: Mood normal.         Behavior: Behavior normal. Thought Content: Thought content normal.         Judgment: Judgment normal.         ASSESSMENT and PLAN  Diagnoses and all orders for this visit:    1. Essential hypertension  -     METABOLIC PANEL, COMPREHENSIVE; Future  -     CBC WITH AUTOMATED DIFF; Future  -     URINALYSIS W/ RFLX MICROSCOPIC; Future    2. Hyperlipidemia, unspecified hyperlipidemia type  -     LIPID PANEL; Future    3. Hypothyroidism due to Hashimoto's thyroiditis  -     TSH 3RD GENERATION; Future  -     T4, FREE; Future    4. Chronic neck pain  -     XR SPINE CERV FLEX/EXT MAX 3 V; Future    5. Chronic bilateral low back pain without sciatica  -     XR SPINE LUMB 2 OR 3 V; Future    6. Medication refill  -     cyclobenzaprine (FLEXERIL) 10 mg tablet; Take 1 Tablet by mouth two (2) times daily as needed for Muscle Spasm(s). -     levothyroxine (SYNTHROID) 50 mcg tablet; Take 1 Tablet by mouth Daily (before breakfast). -     lisinopriL (PRINIVIL, ZESTRIL) 10 mg tablet; Take 1 Tablet by mouth daily. -     clobetasoL (TEMOVATE) 0.05 % ointment; Apply  to affected area two (2) times a day. -     fluticasone propionate (FLONASE) 50 mcg/actuation nasal spray; 2 Sprays by Both Nostrils route daily. 7. Prostate cancer screening  -     PSA SCREENING (SCREENING); Future    8. Encounter for drug screening  -     Regan Ashley (MW); Future    9. Colon cancer screening  -     OCCULT BLOOD IMMUNOASSAY,DIAGNOSTIC; Future    10. Need for hepatitis C screening test  -     HEPATITIS C AB; Future    HTN - controlled - continue lisinopril 10 mg daily  HLD - update labs   Hypothyroid - continue synthroid 50 mcg daily   Chronic neck and back pain - update xrays and continue flexeril - discussed need for drug screen and controlled substance agreement prior to gabapentin refill -  reviewed    - updated     Follow-up and Dispositions    · Return in about 4 months (around 10/27/2022) for HTN, CHOL.

## 2022-06-30 LAB
ALBUMIN SERPL-MCNC: 4.8 G/DL (ref 3.8–4.9)
ALBUMIN/GLOB SERPL: 2 {RATIO} (ref 1.2–2.2)
ALP SERPL-CCNC: 122 IU/L (ref 44–121)
ALT SERPL-CCNC: 24 IU/L (ref 0–44)
APPEARANCE UR: CLEAR
AST SERPL-CCNC: 33 IU/L (ref 0–40)
BASOPHILS # BLD AUTO: 0.1 X10E3/UL (ref 0–0.2)
BASOPHILS NFR BLD AUTO: 1 %
BILIRUB SERPL-MCNC: 0.3 MG/DL (ref 0–1.2)
BILIRUB UR QL STRIP: NEGATIVE
BUN SERPL-MCNC: 5 MG/DL (ref 6–24)
BUN/CREAT SERPL: 5 (ref 9–20)
CALCIUM SERPL-MCNC: 9.5 MG/DL (ref 8.7–10.2)
CHLORIDE SERPL-SCNC: 100 MMOL/L (ref 96–106)
CHOLEST SERPL-MCNC: 188 MG/DL (ref 100–199)
CO2 SERPL-SCNC: 23 MMOL/L (ref 20–29)
COLOR UR: YELLOW
CREAT SERPL-MCNC: 0.95 MG/DL (ref 0.76–1.27)
DRUGS UR: NORMAL
EGFR: 95 ML/MIN/1.73
EOSINOPHIL # BLD AUTO: 0.2 X10E3/UL (ref 0–0.4)
EOSINOPHIL NFR BLD AUTO: 2 %
ERYTHROCYTE [DISTWIDTH] IN BLOOD BY AUTOMATED COUNT: 12.8 % (ref 11.6–15.4)
GLOBULIN SER CALC-MCNC: 2.4 G/DL (ref 1.5–4.5)
GLUCOSE SERPL-MCNC: ABNORMAL MG/DL
GLUCOSE UR QL STRIP: NEGATIVE
HCT VFR BLD AUTO: 37.8 % (ref 37.5–51)
HCV AB S/CO SERPL IA: 0.1 S/CO RATIO (ref 0–0.9)
HDLC SERPL-MCNC: 47 MG/DL
HGB BLD-MCNC: 12.8 G/DL (ref 13–17.7)
HGB UR QL STRIP: NEGATIVE
IMM GRANULOCYTES # BLD AUTO: 0.1 X10E3/UL (ref 0–0.1)
IMM GRANULOCYTES NFR BLD AUTO: 1 %
IMP & REVIEW OF LAB RESULTS: NORMAL
KETONES UR QL STRIP: NEGATIVE
LDLC SERPL CALC-MCNC: 121 MG/DL (ref 0–99)
LEUKOCYTE ESTERASE UR QL STRIP: NEGATIVE
LYMPHOCYTES # BLD AUTO: 1.3 X10E3/UL (ref 0.7–3.1)
LYMPHOCYTES NFR BLD AUTO: 17 %
MCH RBC QN AUTO: 33.6 PG (ref 26.6–33)
MCHC RBC AUTO-ENTMCNC: 33.9 G/DL (ref 31.5–35.7)
MCV RBC AUTO: 99 FL (ref 79–97)
MICRO URNS: NORMAL
MONOCYTES # BLD AUTO: 0.8 X10E3/UL (ref 0.1–0.9)
MONOCYTES NFR BLD AUTO: 10 %
NEUTROPHILS # BLD AUTO: 5.4 X10E3/UL (ref 1.4–7)
NEUTROPHILS NFR BLD AUTO: 69 %
NITRITE UR QL STRIP: NEGATIVE
PH UR STRIP: 6.5 [PH] (ref 5–7.5)
PLATELET # BLD AUTO: 242 X10E3/UL (ref 150–450)
POTASSIUM SERPL-SCNC: ABNORMAL MMOL/L
PROT SERPL-MCNC: 7.2 G/DL (ref 6–8.5)
PROT UR QL STRIP: NEGATIVE
PSA SERPL-MCNC: 1.1 NG/ML (ref 0–4)
RBC # BLD AUTO: 3.81 X10E6/UL (ref 4.14–5.8)
SODIUM SERPL-SCNC: 143 MMOL/L (ref 134–144)
SP GR UR STRIP: 1 (ref 1–1.03)
T4 FREE SERPL-MCNC: 1.31 NG/DL (ref 0.82–1.77)
TRIGL SERPL-MCNC: 111 MG/DL (ref 0–149)
TSH SERPL DL<=0.005 MIU/L-ACNC: 3.84 UIU/ML (ref 0.45–4.5)
UROBILINOGEN UR STRIP-MCNC: 0.2 MG/DL (ref 0.2–1)
VLDLC SERPL CALC-MCNC: 20 MG/DL (ref 5–40)
WBC # BLD AUTO: 7.9 X10E3/UL (ref 3.4–10.8)

## 2022-07-05 DIAGNOSIS — R79.89 ABNORMAL CBC: Primary | ICD-10-CM

## 2022-07-05 NOTE — PROGRESS NOTES
Results reviewed. Please call patient with results. Hep C negative  Urine test showed a benzodiazepine which is not a medication patient informed us he was taking -   PSA (prostate) was normal   CBC was slightly off - recommend he return for additional lab work - please advise of lab hours and protocol  All other labs are stable.

## 2022-07-19 ENCOUNTER — TELEPHONE (OUTPATIENT)
Dept: INTERNAL MEDICINE CLINIC | Age: 57
End: 2022-07-19

## 2022-07-19 NOTE — TELEPHONE ENCOUNTER
----- Message from Arvind Williamson NP sent at 7/5/2022 10:45 AM EDT -----  Results reviewed. Please call patient with results. Hep C negative  Urine test showed a benzodiazepine which is not a medication patient informed us he was taking -   PSA (prostate) was normal   CBC was slightly off - recommend he return for additional lab work - please advise of lab hours and protocol  All other labs are stable.

## 2024-05-31 ENCOUNTER — APPOINTMENT (OUTPATIENT)
Facility: HOSPITAL | Age: 59
End: 2024-05-31
Payer: MEDICAID

## 2024-05-31 ENCOUNTER — HOSPITAL ENCOUNTER (EMERGENCY)
Facility: HOSPITAL | Age: 59
Discharge: HOME | End: 2024-06-01
Attending: EMERGENCY MEDICINE
Payer: MEDICAID

## 2024-05-31 DIAGNOSIS — F10.10 ALCOHOL ABUSE: Primary | ICD-10-CM

## 2024-05-31 LAB
ANION GAP SERPL CALC-SCNC: 4 MMOL/L (ref 3–18)
BASOPHILS # BLD: 0.1 K/UL (ref 0–0.1)
BASOPHILS NFR BLD: 1 % (ref 0–2)
BUN SERPL-MCNC: 9 MG/DL (ref 7–18)
BUN/CREAT SERPL: 8 (ref 12–20)
CALCIUM SERPL-MCNC: 9.2 MG/DL (ref 8.5–10.1)
CHLORIDE SERPL-SCNC: 104 MMOL/L (ref 100–111)
CO2 SERPL-SCNC: 31 MMOL/L (ref 21–32)
CREAT SERPL-MCNC: 1.16 MG/DL (ref 0.6–1.3)
DIFFERENTIAL METHOD BLD: ABNORMAL
EOSINOPHIL # BLD: 0.4 K/UL (ref 0–0.4)
EOSINOPHIL NFR BLD: 6 % (ref 0–5)
ERYTHROCYTE [DISTWIDTH] IN BLOOD BY AUTOMATED COUNT: 14.7 % (ref 11.6–14.5)
ETHANOL SERPL-MCNC: 8 MG/DL (ref 0–3)
GLUCOSE SERPL-MCNC: 89 MG/DL (ref 74–99)
HCT VFR BLD AUTO: 32.8 % (ref 36–48)
HGB BLD-MCNC: 10.7 G/DL (ref 13–16)
IMM GRANULOCYTES # BLD AUTO: 0 K/UL (ref 0–0.04)
IMM GRANULOCYTES NFR BLD AUTO: 1 % (ref 0–0.5)
LYMPHOCYTES # BLD: 2 K/UL (ref 0.9–3.6)
LYMPHOCYTES NFR BLD: 28 % (ref 21–52)
MCH RBC QN AUTO: 32 PG (ref 24–34)
MCHC RBC AUTO-ENTMCNC: 32.6 G/DL (ref 31–37)
MCV RBC AUTO: 98.2 FL (ref 78–100)
MONOCYTES # BLD: 1 K/UL (ref 0.05–1.2)
MONOCYTES NFR BLD: 14 % (ref 3–10)
NEUTS SEG # BLD: 3.7 K/UL (ref 1.8–8)
NEUTS SEG NFR BLD: 50 % (ref 40–73)
NRBC # BLD: 0 K/UL (ref 0–0.01)
NRBC BLD-RTO: 0 PER 100 WBC
PLATELET # BLD AUTO: 248 K/UL (ref 135–420)
PMV BLD AUTO: 10.8 FL (ref 9.2–11.8)
POTASSIUM SERPL-SCNC: 4.3 MMOL/L (ref 3.5–5.5)
RBC # BLD AUTO: 3.34 M/UL (ref 4.35–5.65)
SODIUM SERPL-SCNC: 139 MMOL/L (ref 136–145)
WBC # BLD AUTO: 7.2 K/UL (ref 4.6–13.2)

## 2024-05-31 PROCEDURE — 90792 PSYCH DIAG EVAL W/MED SRVCS: CPT | Performed by: NURSE PRACTITIONER

## 2024-05-31 PROCEDURE — 85025 COMPLETE CBC W/AUTO DIFF WBC: CPT

## 2024-05-31 PROCEDURE — 70450 CT HEAD/BRAIN W/O DYE: CPT

## 2024-05-31 PROCEDURE — 84443 ASSAY THYROID STIM HORMONE: CPT

## 2024-05-31 PROCEDURE — 80048 BASIC METABOLIC PNL TOTAL CA: CPT

## 2024-05-31 PROCEDURE — 82550 ASSAY OF CK (CPK): CPT

## 2024-05-31 PROCEDURE — 84439 ASSAY OF FREE THYROXINE: CPT

## 2024-05-31 PROCEDURE — 82077 ASSAY SPEC XCP UR&BREATH IA: CPT

## 2024-05-31 PROCEDURE — 82140 ASSAY OF AMMONIA: CPT

## 2024-05-31 PROCEDURE — 99285 EMERGENCY DEPT VISIT HI MDM: CPT

## 2024-05-31 PROCEDURE — 80307 DRUG TEST PRSMV CHEM ANLYZR: CPT

## 2024-05-31 PROCEDURE — 80179 DRUG ASSAY SALICYLATE: CPT

## 2024-05-31 PROCEDURE — 80143 DRUG ASSAY ACETAMINOPHEN: CPT

## 2024-05-31 PROCEDURE — 80164 ASSAY DIPROPYLACETIC ACD TOT: CPT

## 2024-05-31 PROCEDURE — 94761 N-INVAS EAR/PLS OXIMETRY MLT: CPT

## 2024-05-31 PROCEDURE — 80076 HEPATIC FUNCTION PANEL: CPT

## 2024-05-31 ASSESSMENT — ENCOUNTER SYMPTOMS
DIARRHEA: 0
COLOR CHANGE: 0
ABDOMINAL PAIN: 0
RHINORRHEA: 0
VOMITING: 0
SHORTNESS OF BREATH: 0

## 2024-05-31 NOTE — ED PROVIDER NOTES
EMERGENCY DEPARTMENT HISTORY AND PHYSICAL EXAM      Date: 5/31/2024  Patient Name: Tanner Presley    History of Presenting Illness     Chief Complaint   Patient presents with    Drug / Alcohol Assessment       History (Context): Tanner Presley is a 58 y.o. male with significant past medical history of htn, thyroid disease, alcoholism, cocaine use presents ambulatory to the ED today. Patient reports he is an every day alcohol drinker.  Patient ports he last drink alcohol yesterday.  Denies drug use.  Patient reports \"I want to get my life right\". Patient denies SI and HI. Patient denies previous seizures with alcohol detox.       PCP: Vy Kat, BEAU - CNP    No current facility-administered medications for this encounter.     Current Outpatient Medications   Medication Sig Dispense Refill    cetirizine (ZYRTEC) 10 MG tablet TAKE ONE TABLET BY MOUTH ONCE DAILY AS NEEDED FOR ALLERGIES      clobetasol (TEMOVATE) 0.05 % ointment Apply topically 2 times daily      cyclobenzaprine (FLEXERIL) 10 MG tablet Take 10 mg by mouth 2 times daily as needed      fluticasone (FLONASE) 50 MCG/ACT nasal spray 2 sprays by Nasal route daily      gabapentin (NEURONTIN) 300 MG capsule Take 300 mg by mouth 2 times daily.      levothyroxine (SYNTHROID) 50 MCG tablet Take 50 mcg by mouth every morning (before breakfast)      lisinopril (PRINIVIL;ZESTRIL) 10 MG tablet Take 10 mg by mouth daily         Past History     Past Medical History:   Past Medical History:   Diagnosis Date    Alcoholism (HCC)     Chronic pain     Cocaine use     Hypertension     Thyroid disease        Past Surgical History:  Past Surgical History:   Procedure Laterality Date    HERNIA REPAIR      LAYR CLOS WND FACE,FACIAL <2.5CM  1997    ORTHOPEDIC SURGERY         Family History:  Family History   Problem Relation Age of Onset    Hypertension Father     Heart Disease Mother     Hypertension Mother     Hypertension Sister        Social History:

## 2024-05-31 NOTE — VIRTUAL HEALTH
Tanner Presley  [id number]  1965     Social Work Behavioral Health Crisis Assessment    05/31/24  Reason for Cancel: TelePsych Reason for Cancel: Patient impaired  Attempted interview pt cooperative but appears to be impaired -   This writer has spoken to ED staff GAGE Rojas pt slurring words and may be impaired will complete labs and send in a new consult.  Labs:  UDS: not available  ETOH: not available          Electronically signed by Eri Jensen LCSW on 5/31/2024 at 4:48 PM.        Tanner Presley, was evaluated through a synchronous (real-time) audio-video encounter. The patient (and/or guardian if applicable) is aware that this is a billable service, which includes applicable co-pays. This virtual visit was conducted with patient's (and/or legal guardian's) consent. Patient identification was verified, and a caregiver was present when appropriate.  The patient was located at Facility (Appt Department): [facility] EMERGENCY DEPT  [address] [facility] [address]  Loc: [phone]  The provider was located at Home (City/State): [place], [place]   Confirm you are appropriately licensed, registered, or certified to deliver care in the state where the patient is located as indicated above. If you are not or unsure, please re-schedule the visit: Yes, I confirm.   [place] Consult to Tele-Psych  Consult performed by: Eri Jensen LCSW  Consult ordered by: Amna Gann PA         Total time spent on this encounter: Not billed by time    --Eri Jensen LCSW on 5/31/2024 at 4:41 PM    An electronic signature was used to authenticate this note.

## 2024-05-31 NOTE — ED TRIAGE NOTES
[facility] because needs detox. Patient states that he feels he may wander in front of a bus.

## 2024-06-01 VITALS
WEIGHT: 130 LBS | HEART RATE: 78 BPM | BODY MASS INDEX: 18.2 KG/M2 | TEMPERATURE: 97.4 F | OXYGEN SATURATION: 100 % | RESPIRATION RATE: 17 BRPM | DIASTOLIC BLOOD PRESSURE: 90 MMHG | SYSTOLIC BLOOD PRESSURE: 143 MMHG | HEIGHT: 71 IN

## 2024-06-01 LAB
ALBUMIN SERPL-MCNC: 3.5 G/DL (ref 3.4–5)
ALBUMIN/GLOB SERPL: 1.1 (ref 0.8–1.7)
ALP SERPL-CCNC: 122 U/L (ref 45–117)
ALT SERPL-CCNC: 28 U/L (ref 16–61)
AMMONIA PLAS-SCNC: 25 UMOL/L (ref 11–32)
AMPHET UR QL SCN: NEGATIVE
APAP SERPL-MCNC: <2 UG/ML (ref 10–30)
AST SERPL-CCNC: 23 U/L (ref 10–38)
BARBITURATES UR QL SCN: NEGATIVE
BENZODIAZ UR QL: POSITIVE
BILIRUB DIRECT SERPL-MCNC: 0.1 MG/DL (ref 0–0.2)
BILIRUB SERPL-MCNC: 0.3 MG/DL (ref 0.2–1)
CANNABINOIDS UR QL SCN: NEGATIVE
CK SERPL-CCNC: 132 U/L (ref 39–308)
COCAINE UR QL SCN: NEGATIVE
GLOBULIN SER CALC-MCNC: 3.3 G/DL (ref 2–4)
Lab: ABNORMAL
METHADONE UR QL: NEGATIVE
OPIATES UR QL: NEGATIVE
PCP UR QL: NEGATIVE
PROT SERPL-MCNC: 6.8 G/DL (ref 6.4–8.2)
SALICYLATES SERPL-MCNC: 2.5 MG/DL (ref 2.8–20)
T4 FREE SERPL-MCNC: 0.7 NG/DL (ref 0.7–1.5)
TSH SERPL DL<=0.05 MIU/L-ACNC: 1.41 UIU/ML (ref 0.36–3.74)
VALPROATE SERPL-MCNC: 40 UG/ML (ref 50–100)

## 2024-06-01 PROCEDURE — 2720000010 HC SURG SUPPLY STERILE

## 2024-06-01 PROCEDURE — 99283 EMERGENCY DEPT VISIT LOW MDM: CPT

## 2024-06-01 PROCEDURE — 95706 EEG WO VID 2-12HR INTMT MNTR: CPT

## 2024-06-01 PROCEDURE — 93005 ELECTROCARDIOGRAM TRACING: CPT

## 2024-06-01 NOTE — ED NOTES
9:10 PM :Pt care assumed Sanjuanita  , ED provider. Pt complaint(s), current treatment plan, progression and available diagnostic results have been discussed thoroughly. The patient was seen and evaluated on my shift.   Rounding occurred: No   Intended Disposition: TBD   Pending diagnostic reports and/or labs (please list): ETOH treatment        9:15 PM  Evaluated patient, at bedside he was alert and oriented x 4, calm and cooperative, answering questions appropriately, no active signs of withdrawal still pending evaluation by telepsych    ED Course as of 06/01/24 0253   Fri May 31, 2024   2230 Spoke to telepsych, there is concerns about possible altered mental status and possible encephalopathic due to patient's interview. [CD]   2234 When I evaluated patient he was alert and oriented x 4, seem to have a flat affect, no signs of of active withdrawal.   [CD]   Sat Jun 01, 2024 0252 Further workup was initiated, and only abnormality is that patient's UDS did show benzos, patient CTA of the head, and other lab work was normal [CD]      ED Course User Index  [CD] Fauzia Jaramillo PA-C        2:52 AM :Pt care assumed to Joseph, ED provider. Pt complaint(s), current treatment plan, progression and available diagnostic results have been discussed thoroughly. The patient was seen and evaluated on my shift.     Intended Disposition: TBD   Pending diagnostic reports and/or labs (please list): EEG or AMS  , Crisis eval .      Fauzia Jaramillo PA-C  06/01/24 0253

## 2024-06-01 NOTE — VIRTUAL HEALTH
Tanner Presley  [id number]  1965     EMERGENCY DEPARTMENT TELEPSYCHIATRY EVALUATION    24    Chief Complaint:  “I didn't end up in the emergency department. I guess I kind of did. I was trying to help myself detox.”  HPI: Patient is a 58 y.o.  male who presents for a mental health exam. Patient presented to the ED on 24 from the community.  Patient reportedly made a suicidal statement about wandering in front of a bus and also indicated to ED staff that he wants to detox from alcohol. Patient told the SW that he thinks he's dehydrated.  Patient is oriented to person, place and time but intermittently confused, extremely tangential, illogical, with increased thought latency and slow, slurred, rambling, and at times mumbled speech. Patient appears drowsy, at times appears to be falling asleep mid conversation. He asked this writer to repeat questions multiple times and then was questioning whether or not he had answered the question after providing a tangential and illogical response. Patient initially gave his phone number when asked for his . Patient was generally cooperative but became irritable and agitated at one point when this writer was asking about medications. Asked this writer to \"keep it down\" and stated that he didn't want \"all these people in my room\" to hear our conversation. Patient was alone in the room with this writer. At one point patient appeared suspicious and asked, \"Are you looking at me? I'm right here.\" Patient referenced a little man on his shoulder when talking to the SW earlier. He denies AH/VH. Patient correctly identified the month and year. He believes it's May 25. He correctly identifies that he is at [facility]. The patient is oriented to self. SW attempted to reach patient's girlfriend in [place] for collateral. Patient gave a nonworking number. A second number went to [employer]. Patient also reportedly attempted to give a phone number for  delirium tremens.       Past Psychiatric History:  Previous Diagnoses/symptoms: bipolar disorder  Previous suicide attempts/self-harm: Denies  Inpatient psychiatric hospitalizations: yes-- \"heat exhaustion and depression\"   Current outpatient psychiatric provider: Patient reports seeing a psychiatrist at [facility]. Dispense reports indicates psychotropic medication prescriptions have come from various providers.   Current therapist: Cody Mohamud at [facility]   Previous psychiatric medication trials: Effexor, Wellbutrin, naltrexone   Current psychiatric medications: Depakote, Remeron, Latuda    Family Psychiatric History: Grandfather with AUD    Substance Abuse History:  Tobacco: Endorses smoking 10-12 cigarettes per day  Alcohol: Endorses daily use  Marijuana: Endorses past use  Stimulant: Endorses past use of cocaine  Opiates: Denies  Benzodiazepine: Denies  Other illicit drug usage: Denies  History of substance/alcohol abuse treatment: Yes-- multiple prior treatment episodes at various facilities    Social History:  Education: Some college  Living Situation/Interest: patient gave inconsistent reports of staying in a shelter, with friends, and on the street throughout the course of assessment  Marital/Committed relationship and parenting hx: Patient states he is single but also gave a phone number for a girlfriend in [place].  Occupation: Unemployed  Legal History/Hx of Violence: Past history of DUI x 2  Spiritual History: raised [employer]  Psychological trauma, neglect, or abuse: denies hx of trauma/abuse   Access to guns or other weapons: denies having access to firearms/dangerous weapons     Past Medical History:  Active Ambulatory Problems     Diagnosis Date Noted   • Hypothyroidism due to Hashimoto's thyroiditis 04/20/2017   • Neck pain 03/06/2017   • Alcoholism (HCC) 03/06/2017   • Psoriasis 03/16/2017   • H/O seasonal allergies 03/06/2017   • Anxiety 03/06/2017   • Essential hypertension

## 2024-06-01 NOTE — DISCHARGE INSTRUCTIONS
You were evaluated for alcohol use .  Based on your work-up it was deemed that she was stable for discharge.  Please follow-up with your primary care physician if you have any further concerns and go over your work-up.  If you experience any chest pain, shortness of breath, worsening abdominal pain, vomiting blood, worsening headache, seizures, or any worsening of your symptoms please return to the emergency department immediately.  If you have any pending results or any further questions please contact the emergency department at [phone].

## 2024-06-01 NOTE — ED NOTES
Return call form Kimberly received. Patient transport will arrive in about 9 min. Patient given his clothing to get dressed and discharged paperwork reviewed with patient.

## 2024-06-01 NOTE — VIRTUAL HEALTH
Tanner Presley  [id number]  1965     EMERGENCY DEPARTMENT TELEPSYCHIATRY EVALUATION    24    Chief Complaint:  “Feeling destitute”  HPI: Patient is a 58 y.o.  male who presents for Psychiatric Evaluation. Patient presented to the ED on 24 from [facility]. Per ED documentation: \"Patient here from [facility] because needs detox. Patient states that he feels he may wander in front of a bus.\"     Patient is seen as a reconsult with this patient being too impaired to participate in the first consult, continued impairment in the second consult with slurred speech, easily distractible, tangential, slow speech, and often goes off topic, case was escalated to PMHNP who noted patient was intermittently confused, extremely tangential, illogical, with increased thought latency and slow, slurred, rambling and at times mumbled speech.  Was noted to be drowsy and falling asleep mid conversation, requiring questions to be repeated multiple times and would provide illogical responses.  Patient gives his phone number when asked for date of birth.  For the most part cooperative but irritable when discussing medication.  Patient exhibited paranoia.  Neither  or PMHNP were able to obtain collateral due to nonworking numbers and going to a voicemail.  Patient reportedly gave a phone number for his  mother.  Previous PMHNP recommended medical admission for detox for alcohol and further workup of delirium with inpatient psychiatric consult to determine if patient meets criteria for inpatient psychiatric admission once medically cleared.  Recommend obtaining CMP, ammonia level, Valproic acid level, CK, TSH, and EKG.  Urine drug screen positive for benzodiazepines, Valproic Acid level at 40 (L), T4 0.7, TSH 1.41, CK: 132, Ammonia 25, and EKG not completed as of yet.     During this assessment patient is more alert and oriented. Patient was able to answer questions appropriately.  Able to  Male gender, Active substance abuse, Highly impulsive behaviors, Social isolation, No outpatient services in place, and No collateral information to support safety    C-SSRS Score       6/1/2024     8:04 AM   C-SSRS Suicide Screening   1) Within the past month, have you wished you were dead or wished you could go to sleep and not wake up?  No   2) Have you actually had any thoughts of killing yourself?  No   3) Have you been thinking about how you might kill yourself?  No   4) Have you had these thoughts and had some intention of acting on them?  No   5) Have you started to work out or worked out the details of how to kill yourself? Do you intend to carry out this plan?  No   6) Have you ever done anything, started to do anything, or prepared to do anything to end your life? No   Did this occur within the past 3 months?  No    :      Overall Level Suicide Risk: TelePsych CSSRS Risk Level: Low Risk    Assessment:   Patient is a 58 y.o.  male who presents for Psychiatric Evaluation.  Patient is alert and oriented.  Patient is calm and cooperative.  Patient is able to maintain attention and a logical conversation.  Patient able to recall information appropriately and answer questions appropriately.  Patient denies suicidal ideations, thoughts of self-harm, denies auditory or visual hallucinations.  Patient endorses alcohol consumption last night as well as consuming a unknown green pill.  UDS shows patient positive for benzos which he believes may have been the green pill.  Patient is future oriented and looks forward to transitioning to [facility].  At this time patient does not meet criteria for psychiatric hold and recommend following up with a rehab/detox center/outpatient.       Dx:   Substance induced psychosis     Plan:  Please refer the patient to a psychiatrist and therapist for continued care and support after discharge and The patient is cleared to be discharged from a psychiatric point of view,

## 2024-06-01 NOTE — ED NOTES
This nurse spoke sana Vera from [facility]. Kimberly was notified of patient being discharged and medically cleared. Facility to arrange transport and to call the ED back with arrangements.

## 2024-06-01 NOTE — ED PROVIDER NOTES
ED Course as of 06/01/24 0846   Fri May 31, 2024   2230 Spoke to telepsych, there is concerns about possible altered mental status and possible encephalopathic due to patient's interview. [CD]   2234 When I evaluated patient he was alert and oriented x 4, seem to have a flat affect, no signs of of active withdrawal.   [CD]   Sat Jun 01, 2024   0252 Further workup was initiated, and only abnormality is that patient's UDS did show benzos, patient CTA of the head, and other lab work was normal [CD]   0256 Request alcohol detox,. SI with plan to wander in front of bus. Tele-psych concern for encephalopathy. Negative labs and CT other than Valproic acid low. If no seizure activity on Cerebell for 2 hours, can medically clear. Low CIWA [JM]   0606 Jm to [facility] 59 yo male cc- si to friends / workup- unremarkable / they were concerned that he was altered/ plan- pending re-evaluation by tele psych.  [KS]   0846 Per telepsych patient is cleared to be discharged.  Discussed workup with patient.  He agrees with plan.  He will be going to rehab facility. [KS]      ED Course User Index  [CD] Fauzia Jaramillo PA-C  [JM] Colton Ruiz DO  [KS] Travis Verma MD Sarpong, Keneth, MD  06/01/24 0846

## 2024-06-01 NOTE — ED NOTES
Patient escorted to waiting area. Patient ask for his belongings and states that he came with a burgundy bag of clothing. Patient notified that he has no belongings in he lockers or with security. Patient notified to ask the facility that he came from about his belongings or to call [facility] due to coming to [facility] in police custody.

## 2024-06-01 NOTE — PROGRESS NOTES
5:32 AM : Pt care transferred to me from Fauzia Jaramillo PA-C  ,ED provider. History of patient complaint(s), available diagnostic reports and current treatment plan has been discussed thoroughly.   Bedside rounding on patient occured : No .  Intended disposition of patient : TBD  Pending diagnostics reports and/or labs (please list):   ED Course as of 06/01/24 0533   Fri May 31, 2024   2230 Spoke to telepsych, there is concerns about possible altered mental status and possible encephalopathic due to patient's interview. [CD]   2234 When I evaluated patient he was alert and oriented x 4, seem to have a flat affect, no signs of of active withdrawal.   [CD]   Sat Jun 01, 2024   0252 Further workup was initiated, and only abnormality is that patient's UDS did show benzos, patient CTA of the head, and other lab work was normal [CD]   0256 Request alcohol detox,. SI with plan to wander in front of bus. Tele-psych concern for encephalopathy. Negative labs and CT other than Valproic acid low. If no seizure activity on Cerebell for 2 hours, can medically clear. Low CIWA [JM]      ED Course User Index  [CD] Fauzia Jaramillo PA-C  [JM] Colton Ruiz,      On my reassessment, no seizure activity after 1 hour of wearing the cerebellum.  Patient no able to awaken, answer my questions slowly but appropriately, is oriented to self, place, day of the week.  Believes he simply has not been sleeping well lately.  Does not recall making any statements last night of wanting to harm himself.  Does not have any intent to harm himself or others at this time.  Simply feels he needs to sleep better.  Reviewed laboratory studies, CT imaging with no significant abnormalities to suggest medical cause for his condition.  No observed seizure-like activity.  Patient denies headache or complaint of neck pain, is afebrile thus do not feel lumbar puncture is indicated.  After 2 hours of the Cerebell, will have the telepsychiatrist  reevaluate the patient.  My impression is patient should be safe for discharge home.    Patient will be signed out to Dr. Verma at shift change.    Colton Ruiz DO  Emergency Physician  [facility]

## 2024-06-01 NOTE — BSMART NOTE
Crisis Note: Spoke with RAUL Cage, re: crisis will see patient when medically cleared d/t possible encephalopathy; verbalized understanding. Crisis will assist as needed.

## 2024-06-02 LAB
EKG ATRIAL RATE: 357 BPM
EKG DIAGNOSIS: NORMAL
EKG P AXIS: 60 DEGREES
EKG Q-T INTERVAL: 362 MS
EKG QRS DURATION: 102 MS
EKG QTC CALCULATION (BAZETT): 427 MS
EKG R AXIS: -21 DEGREES
EKG T AXIS: 43 DEGREES
EKG VENTRICULAR RATE: 84 BPM

## 2024-06-02 PROCEDURE — 93010 ELECTROCARDIOGRAM REPORT: CPT | Performed by: INTERNAL MEDICINE

## 2024-08-05 ENCOUNTER — TRANSCRIBE ORDERS (OUTPATIENT)
Facility: HOSPITAL | Age: 59
End: 2024-08-05

## 2024-08-05 ENCOUNTER — HOSPITAL ENCOUNTER (OUTPATIENT)
Facility: HOSPITAL | Age: 59
Discharge: HOME | End: 2024-08-08

## 2024-08-05 ENCOUNTER — HOSPITAL ENCOUNTER (OUTPATIENT)
Facility: HOSPITAL | Age: 59
Discharge: HOME | End: 2024-08-08
Payer: MEDICAID

## 2024-08-05 DIAGNOSIS — E03.9 MYXEDEMA HEART DISEASE: ICD-10-CM

## 2024-08-05 DIAGNOSIS — E61.1 IRON DEFICIENCY: ICD-10-CM

## 2024-08-05 DIAGNOSIS — E53.8 BIOTIN-(PROPIONYL-COA-CARBOXYLASE) LIGASE DEFICIENCY: ICD-10-CM

## 2024-08-05 DIAGNOSIS — I10 ESSENTIAL HYPERTENSION, MALIGNANT: ICD-10-CM

## 2024-08-05 DIAGNOSIS — E53.8 BIOTIN-(PROPIONYL-COA-CARBOXYLASE) LIGASE DEFICIENCY: Primary | ICD-10-CM

## 2024-08-05 DIAGNOSIS — E55.9 AVITAMINOSIS D: ICD-10-CM

## 2024-08-05 DIAGNOSIS — M54.2 CERVICALGIA: ICD-10-CM

## 2024-08-05 DIAGNOSIS — M54.2 CERVICALGIA: Primary | ICD-10-CM

## 2024-08-05 DIAGNOSIS — I51.9 MYXEDEMA HEART DISEASE: ICD-10-CM

## 2024-08-05 DIAGNOSIS — M54.12 BRACHIAL NEURITIS: ICD-10-CM

## 2024-08-05 DIAGNOSIS — R53.83 FATIGUE, UNSPECIFIED TYPE: ICD-10-CM

## 2024-08-05 LAB — LABCORP SPECIMEN COLLECTION: NORMAL

## 2024-08-05 PROCEDURE — 72050 X-RAY EXAM NECK SPINE 4/5VWS: CPT

## 2024-08-05 PROCEDURE — 99001 SPECIMEN HANDLING PT-LAB: CPT
